# Patient Record
Sex: FEMALE | Race: WHITE | Employment: FULL TIME | URBAN - METROPOLITAN AREA
[De-identification: names, ages, dates, MRNs, and addresses within clinical notes are randomized per-mention and may not be internally consistent; named-entity substitution may affect disease eponyms.]

---

## 2017-04-24 RX ORDER — LOSARTAN POTASSIUM 100 MG/1
100 TABLET ORAL DAILY
Qty: 30 TABLET | Refills: 0 | Status: SHIPPED | OUTPATIENT
Start: 2017-04-24 | End: 2017-05-08

## 2017-04-24 RX ORDER — METOPROLOL TARTRATE 50 MG/1
50 TABLET, FILM COATED ORAL DAILY
Qty: 60 TABLET | Refills: 0 | Status: SHIPPED | OUTPATIENT
Start: 2017-04-24 | End: 2017-05-08 | Stop reason: SDUPTHER

## 2017-05-08 ENCOUNTER — OFFICE VISIT (OUTPATIENT)
Dept: FAMILY MEDICINE CLINIC | Age: 62
End: 2017-05-08

## 2017-05-08 VITALS
SYSTOLIC BLOOD PRESSURE: 128 MMHG | OXYGEN SATURATION: 98 % | HEART RATE: 66 BPM | WEIGHT: 206 LBS | DIASTOLIC BLOOD PRESSURE: 84 MMHG | BODY MASS INDEX: 32.26 KG/M2

## 2017-05-08 DIAGNOSIS — G89.29 CHRONIC BILATERAL LOW BACK PAIN WITHOUT SCIATICA: ICD-10-CM

## 2017-05-08 DIAGNOSIS — K21.9 GASTROESOPHAGEAL REFLUX DISEASE WITHOUT ESOPHAGITIS: ICD-10-CM

## 2017-05-08 DIAGNOSIS — E78.5 DYSLIPIDEMIA: ICD-10-CM

## 2017-05-08 DIAGNOSIS — M54.50 CHRONIC BILATERAL LOW BACK PAIN WITHOUT SCIATICA: ICD-10-CM

## 2017-05-08 DIAGNOSIS — I10 ESSENTIAL HYPERTENSION: Primary | ICD-10-CM

## 2017-05-08 DIAGNOSIS — I10 ESSENTIAL HYPERTENSION: ICD-10-CM

## 2017-05-08 LAB
ANION GAP SERPL CALCULATED.3IONS-SCNC: 16 MMOL/L (ref 3–16)
BUN BLDV-MCNC: 21 MG/DL (ref 7–20)
CALCIUM SERPL-MCNC: 9.5 MG/DL (ref 8.3–10.6)
CHLORIDE BLD-SCNC: 104 MMOL/L (ref 99–110)
CHOLESTEROL, TOTAL: 232 MG/DL (ref 0–199)
CO2: 23 MMOL/L (ref 21–32)
CREAT SERPL-MCNC: 0.9 MG/DL (ref 0.6–1.1)
GFR AFRICAN AMERICAN: >60
GFR NON-AFRICAN AMERICAN: >60
GLUCOSE BLD-MCNC: 92 MG/DL (ref 70–99)
HDLC SERPL-MCNC: 72 MG/DL (ref 40–60)
LDL CHOLESTEROL CALCULATED: 129 MG/DL
POTASSIUM SERPL-SCNC: 4.4 MMOL/L (ref 3.5–5.1)
SODIUM BLD-SCNC: 143 MMOL/L (ref 136–145)
TRIGL SERPL-MCNC: 153 MG/DL (ref 0–150)
VLDLC SERPL CALC-MCNC: 31 MG/DL

## 2017-05-08 PROCEDURE — 99214 OFFICE O/P EST MOD 30 MIN: CPT | Performed by: FAMILY MEDICINE

## 2017-05-08 RX ORDER — METOPROLOL TARTRATE 50 MG/1
50 TABLET, FILM COATED ORAL DAILY
Qty: 30 TABLET | Refills: 5 | Status: SHIPPED | OUTPATIENT
Start: 2017-05-08 | End: 2017-11-06 | Stop reason: SDUPTHER

## 2017-05-08 RX ORDER — LOSARTAN POTASSIUM 100 MG/1
100 TABLET ORAL DAILY
Qty: 30 TABLET | Refills: 5 | Status: SHIPPED | OUTPATIENT
Start: 2017-05-08 | End: 2017-05-10 | Stop reason: SDUPTHER

## 2017-05-10 DIAGNOSIS — I10 ESSENTIAL HYPERTENSION: ICD-10-CM

## 2017-05-11 RX ORDER — OMEPRAZOLE 20 MG/1
20 CAPSULE, DELAYED RELEASE ORAL 2 TIMES DAILY PRN
Qty: 60 CAPSULE | Refills: 5 | Status: SHIPPED | OUTPATIENT
Start: 2017-05-11 | End: 2017-11-06 | Stop reason: SDUPTHER

## 2017-05-11 RX ORDER — LOSARTAN POTASSIUM 100 MG/1
100 TABLET ORAL DAILY
Qty: 30 TABLET | Refills: 5 | Status: SHIPPED | OUTPATIENT
Start: 2017-05-11 | End: 2017-11-06 | Stop reason: SDUPTHER

## 2017-11-02 NOTE — PROGRESS NOTES
History and Physical      Chief Complaint:   Jannet Brdaley is a 63 y.o. female who presents for complete physical examination. HPI:       HTN:  Tolerating medications well and taking them as directed. Does not check BP at home. No symptoms concerning for end organ damage are present. Has not had BP meds yet today as fasting. Hiatal hernia/GERD: on omeprazole which helps with her symptoms- takes 1-2 times daily prn. If she doesn't take the omeprazole has a lot of heart burn. Hx of migraines: improved since the past.  Takes imitrex prn. Dyslipidemia:  Working on diet and weight loss. Taking omega 3. Fibromyalgia:  Diagnosed years ago. Doesn't remember what medications she tried but didn't like how she felt. Quit taking everything. Aledevendra was helping. Was told it was affecting her kidneys so stopped it. Chronic buttock pain:  Ortho- UC. Referred by ortho to pain management and given injection which didn't help at all. The biomed cream seems to be helping. Present 9+ months. Would like a refill. Insomnia:  Has had trouble sleeping for a long time. Worse recently. Takes advil prn. No improvement with melatonin in the past.     Hotflashes:  Gets hotflashes from time to time sometimes. Chronic since going of HRT 3-4 years ago. Vit d def: currently not on vit d. Hx knee infection:  8/2016 s/p infected L knee. S/p surgery to wash it out and PICC line. Resolved now. Has residual pain from time to time. 7/18/16  to patient Faizan Matute - Dr. Mervin Landry. Was not able to have kids. Joyce Eugene has 2 kids. HM:  S/p ALEXIS in 2002 for heavy periods and endometriosis. Off premarin 3-4 years. Per patient does not have a cervix.          The 10-year ASCVD risk score (Paulnia Soriano, et al., 2013) is: 4.9%    Values used to calculate the score:      Age: 63 years      Sex: Female      Is Non- : No      Diabetic: No      Tobacco smoker: No      Systolic Blood Pressure: 140 mmHg      Is BP treated: Yes      HDL Cholesterol: 72 mg/dL      Total Cholesterol: 232 mg/dL    Wt Readings from Last 3 Encounters:   11/06/17 210 lb 12.8 oz (95.6 kg)   05/08/17 206 lb (93.4 kg)   12/05/16 203 lb (92.1 kg)     BP Readings from Last 3 Encounters:   11/06/17 (!) 140/92   05/08/17 128/84   12/05/16 138/86       Patient Active Problem List   Diagnosis    Migraine    Insomnia    Fibromyalgia    HTN (hypertension)    GERD (gastroesophageal reflux disease)    Hiatal hernia    Healthcare maintenance    HLD (hyperlipidemia)       Preventive Care:  Health Maintenance   Topic Date Due    Cervical cancer screen  08/19/1978    Zostavax vaccine  08/19/2017    Flu vaccine (1) 09/01/2017    Colon Cancer Screen FIT/FOBT  12/21/2017    Breast cancer screen  02/23/2018    Lipid screen  05/08/2022    DTaP/Tdap/Td vaccine (2 - Td) 07/18/2026    Hepatitis C screen  Completed    HIV screen  Completed        Immunization History   Administered Date(s) Administered    Tdap (Boostrix, Adacel) 07/18/2016       Allergies   Allergen Reactions    Clarithromycin      Breathing and itching     Outpatient Prescriptions Marked as Taking for the 11/6/17 encounter (Office Visit) with Della Meeks MD   Medication Sig Dispense Refill    omeprazole (PRILOSEC) 20 MG delayed release capsule Take 1 capsule by mouth 2 times daily as needed (1 daily, 2 times per day as needed) 1 to 2 capsules by mouth every day.  60 capsule 5    losartan (COZAAR) 100 MG tablet Take 1 tablet by mouth daily 30 tablet 5    metoprolol tartrate (LOPRESSOR) 50 MG tablet Take 1 tablet by mouth daily 30 tablet 5    Multiple Vitamins-Minerals (HAIR/SKIN/NAILS PO) Take 3,000 mcg by mouth 3 times daily      Cholecalciferol (VITAMIN D3) 1000 UNITS TABS Take by mouth      Omega-3 Fatty Acids (OMEGA-3 FISH OIL) 1000 MG CAPS Take by mouth Daily with supper          Past Medical History:   Diagnosis Date    Fibromyalgia 1990's regular rhythm, normal heart sounds. Exam reveals no gallop and no friction rub. No murmur heard. Pulmonary/Chest: Effort normal and breath sounds normal. No respiratory distress. She has no wheezes, rhonchi or rales. Abdominal: Soft, non-tender. Bowel sounds are normal. She exhibits no palpable organomegaly or mass. Musculoskeletal: Normal range of motion. She exhibits no edema. Neurological: She is alert and oriented. No cranial nerve deficit. Coordination normal.   Skin: Skin is warm and dry. There is no rash or erythema. Psychiatric: She has a normal mood and affect. Her speech is normal and behavior is normal. Judgment, cognition and memory are normal.       Assessment/Plan     1. Healthcare maintenance  Annual physical exam done today. Counseled on preventative care and a healthy lifestlye. Discussed optimal calcium intake emphasizing the importance of getting as much of the daily calcium requirement as possible from dietary sources. Immunization history reviewed. FIT cards. Declined colonoscopy. - DEXA Bone Density Axial Skeleton; Future  - Varicella-zoster vaccine subcutaneous    2. Essential hypertension  Stable. Continue current regimen. Monitor at home. - losartan (COZAAR) 100 MG tablet; Take 1 tablet by mouth daily  Dispense: 30 tablet; Refill: 5  - metoprolol tartrate (LOPRESSOR) 50 MG tablet; Take 1 tablet by mouth daily  Dispense: 30 tablet; Refill: 5  - BASIC METABOLIC PANEL; Future    3. Gastroesophageal reflux disease without esophagitis  Stable. Continue current regimen. Risks of PPI's discussed. - omeprazole (PRILOSEC) 20 MG delayed release capsule; Take 1 capsule by mouth 2 times daily as needed (1 daily, 2 times per day as needed) 1 to 2 capsules by mouth every day. Dispense: 60 capsule; Refill: 5  - Vitamin B12; Future  - Methylmalonic Acid, Serum; Future    4.  Hyperlipidemia, unspecified hyperlipidemia type  Counseled on lifestyle modifications including diet and

## 2017-11-06 ENCOUNTER — OFFICE VISIT (OUTPATIENT)
Dept: FAMILY MEDICINE CLINIC | Age: 62
End: 2017-11-06

## 2017-11-06 VITALS
SYSTOLIC BLOOD PRESSURE: 140 MMHG | HEART RATE: 100 BPM | OXYGEN SATURATION: 96 % | DIASTOLIC BLOOD PRESSURE: 92 MMHG | BODY MASS INDEX: 33.02 KG/M2 | WEIGHT: 210.8 LBS

## 2017-11-06 DIAGNOSIS — I10 ESSENTIAL HYPERTENSION: ICD-10-CM

## 2017-11-06 DIAGNOSIS — M79.18 BUTTOCK PAIN: ICD-10-CM

## 2017-11-06 DIAGNOSIS — K21.9 GASTROESOPHAGEAL REFLUX DISEASE WITHOUT ESOPHAGITIS: ICD-10-CM

## 2017-11-06 DIAGNOSIS — G47.00 INSOMNIA, UNSPECIFIED TYPE: ICD-10-CM

## 2017-11-06 DIAGNOSIS — E55.9 VITAMIN D DEFICIENCY: ICD-10-CM

## 2017-11-06 DIAGNOSIS — R23.2 HOT FLASHES: ICD-10-CM

## 2017-11-06 DIAGNOSIS — Z00.00 HEALTHCARE MAINTENANCE: Primary | ICD-10-CM

## 2017-11-06 DIAGNOSIS — E78.5 HYPERLIPIDEMIA, UNSPECIFIED HYPERLIPIDEMIA TYPE: ICD-10-CM

## 2017-11-06 LAB
ANION GAP SERPL CALCULATED.3IONS-SCNC: 14 MMOL/L (ref 3–16)
BUN BLDV-MCNC: 21 MG/DL (ref 7–20)
CALCIUM SERPL-MCNC: 10 MG/DL (ref 8.3–10.6)
CHLORIDE BLD-SCNC: 100 MMOL/L (ref 99–110)
CHOLESTEROL, TOTAL: 258 MG/DL (ref 0–199)
CO2: 26 MMOL/L (ref 21–32)
CREAT SERPL-MCNC: 0.9 MG/DL (ref 0.6–1.2)
GFR AFRICAN AMERICAN: >60
GFR NON-AFRICAN AMERICAN: >60
GLUCOSE BLD-MCNC: 96 MG/DL (ref 70–99)
HDLC SERPL-MCNC: 76 MG/DL (ref 40–60)
LDL CHOLESTEROL CALCULATED: 141 MG/DL
POTASSIUM SERPL-SCNC: 4.5 MMOL/L (ref 3.5–5.1)
SODIUM BLD-SCNC: 140 MMOL/L (ref 136–145)
TRIGL SERPL-MCNC: 204 MG/DL (ref 0–150)
TSH SERPL DL<=0.05 MIU/L-ACNC: 1.69 UIU/ML (ref 0.27–4.2)
VITAMIN B-12: 404 PG/ML (ref 211–911)
VITAMIN D 25-HYDROXY: 25.6 NG/ML
VLDLC SERPL CALC-MCNC: 41 MG/DL

## 2017-11-06 PROCEDURE — 99396 PREV VISIT EST AGE 40-64: CPT | Performed by: FAMILY MEDICINE

## 2017-11-06 PROCEDURE — 90736 HZV VACCINE LIVE SUBQ: CPT | Performed by: FAMILY MEDICINE

## 2017-11-06 PROCEDURE — 90471 IMMUNIZATION ADMIN: CPT | Performed by: FAMILY MEDICINE

## 2017-11-06 RX ORDER — METOPROLOL TARTRATE 50 MG/1
50 TABLET, FILM COATED ORAL DAILY
Qty: 30 TABLET | Refills: 5 | Status: SHIPPED | OUTPATIENT
Start: 2017-11-06 | End: 2018-06-20 | Stop reason: SDUPTHER

## 2017-11-06 RX ORDER — OMEPRAZOLE 20 MG/1
20 CAPSULE, DELAYED RELEASE ORAL 2 TIMES DAILY PRN
Qty: 60 CAPSULE | Refills: 5 | Status: SHIPPED | OUTPATIENT
Start: 2017-11-06 | End: 2018-09-13 | Stop reason: SDUPTHER

## 2017-11-06 RX ORDER — LOSARTAN POTASSIUM 100 MG/1
100 TABLET ORAL DAILY
Qty: 30 TABLET | Refills: 5 | Status: SHIPPED | OUTPATIENT
Start: 2017-11-06 | End: 2018-06-20 | Stop reason: SDUPTHER

## 2017-11-06 NOTE — PATIENT INSTRUCTIONS
Ways to Prevent Osteoporosis     What Is Osteoporosis? Osteoporosis is a disease of the bones in which the bones become so weak that they break easily. Bones are made up of living tissue that is constantly being renewed. This process, called remodeling, consists of two stagesbone resorption and bone formation. During resorption, old bone is broken down and removed. During bone formation, new bone is built to replace the old. The remodeling process changes naturally throughout the life cycle. During childhood and early adulthood, new bone is formed faster than old bone is removed. Between ages 22 and 28, a peak bone mass (maximum density and strength) is reached. After this, bone loss starts to occur more than bone formation. In women, the rate of loss is greatest during the years after menopause . Osteoporosis occurs when there is an excessive amount of bone loss and/or insufficient bone formation. The bones become thin and weak, increasing the chance of fractures . Fractures are most common in the hip, spine, wrist, and ribs, but can occur in any bone. These fractures can result in trouble walking, severe pain, loss of height, spinal deformities, and decreased function. Because bone loss occurs without symptoms, people may not realize they have osteoporosis until a sudden bump or fall causes a fracture. Bone density tests may help predict who is at the greatest risk. Prevention at All Ages   Since this condition occurs mainly in older people, why should you be concerned about it during earlier years? Prevention of osteoporosis can begin in childhood, when bone mass is increasing. Diet, exercise, smoking , and use of alcohol and caffeine all affect bone formation throughout life. Preventive measures are also important when bone mass is decreasing, during midlife and just after menopause in women.    Calcium and Vitamin D   Good nutrition, especially an adequate supply of calcium , plays an important part in

## 2017-11-09 LAB — METHYLMALONIC ACID: 0.16 UMOL/L (ref 0–0.4)

## 2017-12-12 ENCOUNTER — OFFICE VISIT (OUTPATIENT)
Dept: FAMILY MEDICINE CLINIC | Age: 62
End: 2017-12-12

## 2017-12-12 VITALS
SYSTOLIC BLOOD PRESSURE: 118 MMHG | RESPIRATION RATE: 12 BRPM | HEIGHT: 67 IN | BODY MASS INDEX: 33.9 KG/M2 | DIASTOLIC BLOOD PRESSURE: 82 MMHG | TEMPERATURE: 97.8 F | WEIGHT: 216 LBS | OXYGEN SATURATION: 95 % | HEART RATE: 73 BPM

## 2017-12-12 DIAGNOSIS — R05.9 COUGH: ICD-10-CM

## 2017-12-12 DIAGNOSIS — J01.00 ACUTE NON-RECURRENT MAXILLARY SINUSITIS: Primary | ICD-10-CM

## 2017-12-12 PROCEDURE — 3017F COLORECTAL CA SCREEN DOC REV: CPT | Performed by: NURSE PRACTITIONER

## 2017-12-12 PROCEDURE — 3014F SCREEN MAMMO DOC REV: CPT | Performed by: NURSE PRACTITIONER

## 2017-12-12 PROCEDURE — G8427 DOCREV CUR MEDS BY ELIG CLIN: HCPCS | Performed by: NURSE PRACTITIONER

## 2017-12-12 PROCEDURE — G8417 CALC BMI ABV UP PARAM F/U: HCPCS | Performed by: NURSE PRACTITIONER

## 2017-12-12 PROCEDURE — 1036F TOBACCO NON-USER: CPT | Performed by: NURSE PRACTITIONER

## 2017-12-12 PROCEDURE — 99214 OFFICE O/P EST MOD 30 MIN: CPT | Performed by: NURSE PRACTITIONER

## 2017-12-12 PROCEDURE — G8484 FLU IMMUNIZE NO ADMIN: HCPCS | Performed by: NURSE PRACTITIONER

## 2017-12-12 RX ORDER — AMOXICILLIN AND CLAVULANATE POTASSIUM 875; 125 MG/1; MG/1
1 TABLET, FILM COATED ORAL 2 TIMES DAILY
Qty: 20 TABLET | Refills: 0 | Status: SHIPPED | OUTPATIENT
Start: 2017-12-12 | End: 2017-12-22

## 2017-12-12 RX ORDER — BENZONATATE 100 MG/1
100 CAPSULE ORAL 3 TIMES DAILY PRN
Qty: 21 CAPSULE | Refills: 0 | Status: SHIPPED | OUTPATIENT
Start: 2017-12-12 | End: 2017-12-19

## 2017-12-12 ASSESSMENT — ENCOUNTER SYMPTOMS
HEMOPTYSIS: 0
COUGH: 1
HEARTBURN: 0
RHINORRHEA: 1
SHORTNESS OF BREATH: 0
SORE THROAT: 1
WHEEZING: 0

## 2017-12-12 NOTE — PROGRESS NOTES
HPI:  12/12/2017    This is a 63 y.o. female   Chief Complaint   Patient presents with    Pharyngitis     x3 days, PND    Facial Pain    Cough     dry cough (\"coughing so much that had 4 bloody noses last night\"), congestion, some wheezing    Fatigue     Cough   This is a new problem. The current episode started in the past 7 days. The problem has been gradually worsening. The cough is non-productive. Associated symptoms include nasal congestion, postnasal drip, rhinorrhea and a sore throat (mild ). Pertinent negatives include no chest pain, chills, ear congestion, ear pain, fever, headaches, heartburn, hemoptysis, myalgias, rash, shortness of breath, sweats, weight loss or wheezing. Nothing aggravates the symptoms. She has tried OTC cough suppressant (zyrtec x1) for the symptoms. The treatment provided no relief. There is no history of asthma, bronchiectasis, bronchitis, COPD, emphysema, environmental allergies or pneumonia. /82 (Site: Left Arm, Position: Sitting, Cuff Size: Large Adult)   Pulse 73   Temp 97.8 °F (36.6 °C) (Oral)   Resp 12   Ht 5' 7\" (1.702 m)   Wt 216 lb (98 kg)   SpO2 95%   BMI 33.83 kg/m²     Allergies   Allergen Reactions    Clarithromycin      Breathing and itching     Current Outpatient Prescriptions   Medication Sig Dispense Refill    amoxicillin-clavulanate (AUGMENTIN) 875-125 MG per tablet Take 1 tablet by mouth 2 times daily for 10 days 20 tablet 0    benzonatate (TESSALON PERLES) 100 MG capsule Take 1 capsule by mouth 3 times daily as needed for Cough 21 capsule 0    omeprazole (PRILOSEC) 20 MG delayed release capsule Take 1 capsule by mouth 2 times daily as needed (1 daily, 2 times per day as needed) 1 to 2 capsules by mouth every day.  60 capsule 5    losartan (COZAAR) 100 MG tablet Take 1 tablet by mouth daily 30 tablet 5    metoprolol tartrate (LOPRESSOR) 50 MG tablet Take 1 tablet by mouth daily 30 tablet 5    Cholecalciferol (VITAMIN D3) 1000 UNITS TABS Take by mouth      Omega-3 Fatty Acids (OMEGA-3 FISH OIL) 1000 MG CAPS Take by mouth Daily with supper       Multiple Vitamins-Minerals (HAIR/SKIN/NAILS PO) Take 3,000 mcg by mouth 3 times daily       No current facility-administered medications for this visit. Review of Systems   Constitutional: Negative for chills, fever and weight loss. HENT: Positive for postnasal drip, rhinorrhea and sore throat (mild ). Negative for ear pain. Respiratory: Positive for cough. Negative for hemoptysis, shortness of breath and wheezing. Cardiovascular: Negative for chest pain. Gastrointestinal: Negative for heartburn. Musculoskeletal: Negative for myalgias. Skin: Negative for rash. Allergic/Immunologic: Negative for environmental allergies. Neurological: Negative for headaches. Physical Exam   Constitutional: She is oriented to person, place, and time. She appears well-developed and well-nourished. No distress. HENT:   Head: Normocephalic and atraumatic. Right Ear: Tympanic membrane, external ear and ear canal normal.   Left Ear: Tympanic membrane, external ear and ear canal normal.   Nose: Mucosal edema, rhinorrhea and sinus tenderness present. Right sinus exhibits maxillary sinus tenderness. Mouth/Throat: Uvula is midline and mucous membranes are normal. Posterior oropharyngeal erythema present. No oropharyngeal exudate, posterior oropharyngeal edema or tonsillar abscesses. Eyes: Conjunctivae are normal.   Neck: Normal range of motion. Neck supple. No tracheal deviation present. Cardiovascular: Normal rate, regular rhythm, normal heart sounds and intact distal pulses. Exam reveals no gallop and no friction rub. No murmur heard. Pulmonary/Chest: Effort normal and breath sounds normal. No respiratory distress. She has no wheezes. She has no rales. She exhibits no tenderness. Musculoskeletal: Normal range of motion. She exhibits no edema.    Lymphadenopathy:     She has no cervical

## 2018-02-09 ENCOUNTER — OFFICE VISIT (OUTPATIENT)
Dept: FAMILY MEDICINE CLINIC | Age: 63
End: 2018-02-09

## 2018-02-09 VITALS
BODY MASS INDEX: 33.62 KG/M2 | HEIGHT: 67 IN | HEART RATE: 72 BPM | DIASTOLIC BLOOD PRESSURE: 78 MMHG | SYSTOLIC BLOOD PRESSURE: 120 MMHG | WEIGHT: 214.2 LBS | OXYGEN SATURATION: 94 %

## 2018-02-09 DIAGNOSIS — E53.8 VITAMIN B12 DEFICIENCY: ICD-10-CM

## 2018-02-09 DIAGNOSIS — I10 ESSENTIAL HYPERTENSION: ICD-10-CM

## 2018-02-09 DIAGNOSIS — E78.5 HYPERLIPIDEMIA, UNSPECIFIED HYPERLIPIDEMIA TYPE: ICD-10-CM

## 2018-02-09 DIAGNOSIS — G43.109 MIGRAINE WITH AURA AND WITHOUT STATUS MIGRAINOSUS, NOT INTRACTABLE: ICD-10-CM

## 2018-02-09 DIAGNOSIS — M79.7 FIBROMYALGIA: ICD-10-CM

## 2018-02-09 DIAGNOSIS — K21.9 GASTROESOPHAGEAL REFLUX DISEASE WITHOUT ESOPHAGITIS: ICD-10-CM

## 2018-02-09 DIAGNOSIS — Z00.00 HEALTHCARE MAINTENANCE: Primary | ICD-10-CM

## 2018-02-09 DIAGNOSIS — K44.9 HIATAL HERNIA: ICD-10-CM

## 2018-02-09 DIAGNOSIS — E55.9 VITAMIN D DEFICIENCY: ICD-10-CM

## 2018-02-09 LAB
ANION GAP SERPL CALCULATED.3IONS-SCNC: 13 MMOL/L (ref 3–16)
BUN BLDV-MCNC: 12 MG/DL (ref 7–20)
CALCIUM SERPL-MCNC: 9.9 MG/DL (ref 8.3–10.6)
CHLORIDE BLD-SCNC: 104 MMOL/L (ref 99–110)
CHOLESTEROL, TOTAL: 223 MG/DL (ref 0–199)
CO2: 27 MMOL/L (ref 21–32)
CREAT SERPL-MCNC: 0.9 MG/DL (ref 0.6–1.2)
FOLATE: 6.26 NG/ML (ref 4.78–24.2)
GFR AFRICAN AMERICAN: >60
GFR NON-AFRICAN AMERICAN: >60
GLUCOSE BLD-MCNC: 100 MG/DL (ref 70–99)
HDLC SERPL-MCNC: 68 MG/DL (ref 40–60)
LDL CHOLESTEROL CALCULATED: 117 MG/DL
POTASSIUM SERPL-SCNC: 4.3 MMOL/L (ref 3.5–5.1)
SODIUM BLD-SCNC: 144 MMOL/L (ref 136–145)
TRIGL SERPL-MCNC: 190 MG/DL (ref 0–150)
VITAMIN B-12: 459 PG/ML (ref 211–911)
VITAMIN D 25-HYDROXY: 28.6 NG/ML
VLDLC SERPL CALC-MCNC: 38 MG/DL

## 2018-02-09 PROCEDURE — 99396 PREV VISIT EST AGE 40-64: CPT | Performed by: NURSE PRACTITIONER

## 2018-02-09 ASSESSMENT — PATIENT HEALTH QUESTIONNAIRE - PHQ9
SUM OF ALL RESPONSES TO PHQ QUESTIONS 1-9: 0
SUM OF ALL RESPONSES TO PHQ9 QUESTIONS 1 & 2: 0
1. LITTLE INTEREST OR PLEASURE IN DOING THINGS: 0
2. FEELING DOWN, DEPRESSED OR HOPELESS: 0

## 2018-02-09 ASSESSMENT — ENCOUNTER SYMPTOMS
WHEEZING: 0
BACK PAIN: 0
COUGH: 0
RHINORRHEA: 0
CONSTIPATION: 0
APNEA: 0
SINUS PRESSURE: 0
VOMITING: 0
EYE PAIN: 0
NAUSEA: 0
EYE REDNESS: 0
ABDOMINAL DISTENTION: 0
SHORTNESS OF BREATH: 0
DIARRHEA: 0
ABDOMINAL PAIN: 0
BLOOD IN STOOL: 0
CHEST TIGHTNESS: 0

## 2018-02-09 NOTE — PROGRESS NOTES
Currently     Other Topics Concern    Not on file     Social History Narrative    7/18/16     to patient Lanny Alvarez - Dr. Nikhil Manjarrez. Was not able to have kids. Jaret Landeros has 2 kids. Family History   Problem Relation Age of Onset    Heart Disease Mother     Cancer Maternal Cousin      breast      Current Outpatient Prescriptions   Medication Sig Dispense Refill    omeprazole (PRILOSEC) 20 MG delayed release capsule Take 1 capsule by mouth 2 times daily as needed (1 daily, 2 times per day as needed) 1 to 2 capsules by mouth every day. 60 capsule 5    losartan (COZAAR) 100 MG tablet Take 1 tablet by mouth daily 30 tablet 5    metoprolol tartrate (LOPRESSOR) 50 MG tablet Take 1 tablet by mouth daily 30 tablet 5    Cholecalciferol (VITAMIN D3) 1000 UNITS TABS Take by mouth      Omega-3 Fatty Acids (OMEGA-3 FISH OIL) 1000 MG CAPS Take by mouth Daily with supper        No current facility-administered medications for this visit.         Allergies   Allergen Reactions    Clarithromycin      Breathing and itching       Orders Only on 11/06/2017   Component Date Value Ref Range Status    Cholesterol, Total 11/06/2017 258* 0 - 199 mg/dL Final    Triglycerides 11/06/2017 204* 0 - 150 mg/dL Final    HDL 11/06/2017 76* 40 - 60 mg/dL Final    LDL Calculated 11/06/2017 141* <100 mg/dL Final    VLDL Cholesterol Calculated 11/06/2017 41  Not Established mg/dL Final    Sodium 11/06/2017 140  136 - 145 mmol/L Final    Potassium 11/06/2017 4.5  3.5 - 5.1 mmol/L Final    Chloride 11/06/2017 100  99 - 110 mmol/L Final    CO2 11/06/2017 26  21 - 32 mmol/L Final    Anion Gap 11/06/2017 14  3 - 16 Final    Glucose 11/06/2017 96  70 - 99 mg/dL Final    BUN 11/06/2017 21* 7 - 20 mg/dL Final    CREATININE 11/06/2017 0.9  0.6 - 1.2 mg/dL Final    GFR Non- 11/06/2017 >60  >60 Final    Comment: >60 mL/min/1.73m2 EGFR, calc. for ages 25 and older using the  MDRD formula (not corrected for weight), is valid

## 2018-02-14 LAB — METHYLMALONIC ACID: 0.17 UMOL/L (ref 0–0.4)

## 2018-03-28 DIAGNOSIS — K21.9 GASTROESOPHAGEAL REFLUX DISEASE WITHOUT ESOPHAGITIS: ICD-10-CM

## 2018-03-28 RX ORDER — OMEPRAZOLE 20 MG/1
20 CAPSULE, DELAYED RELEASE ORAL 2 TIMES DAILY PRN
Qty: 60 CAPSULE | Refills: 5 | Status: CANCELLED | OUTPATIENT
Start: 2018-03-28

## 2018-06-20 DIAGNOSIS — I10 ESSENTIAL HYPERTENSION: ICD-10-CM

## 2018-06-20 RX ORDER — METOPROLOL TARTRATE 50 MG/1
TABLET, FILM COATED ORAL
Qty: 30 TABLET | Refills: 4 | Status: SHIPPED | OUTPATIENT
Start: 2018-06-20 | End: 2018-11-14 | Stop reason: SDUPTHER

## 2018-06-20 RX ORDER — LOSARTAN POTASSIUM 100 MG/1
TABLET ORAL
Qty: 30 TABLET | Refills: 4 | Status: SHIPPED | OUTPATIENT
Start: 2018-06-20 | End: 2018-11-14 | Stop reason: SDUPTHER

## 2018-09-13 DIAGNOSIS — K21.9 GASTROESOPHAGEAL REFLUX DISEASE WITHOUT ESOPHAGITIS: ICD-10-CM

## 2018-09-13 RX ORDER — OMEPRAZOLE 20 MG/1
CAPSULE, DELAYED RELEASE ORAL
Qty: 60 CAPSULE | Refills: 4 | Status: SHIPPED | OUTPATIENT
Start: 2018-09-13 | End: 2019-02-11 | Stop reason: SDUPTHER

## 2018-09-13 NOTE — TELEPHONE ENCOUNTER
Medication:   Requested Prescriptions     Pending Prescriptions Disp Refills    omeprazole (PRILOSEC) 20 MG delayed release capsule [Pharmacy Med Name: OMEPRAZOLE DR 20 MG CAPSULE] 60 capsule 4     Sig: TAKE ONE TO TWO CAPSULES BY MOUTH DAILY AS NEEDED        Last Filled:  11.6.2017 #60 w/ 5 refills     Patient Phone Number: [phone] (home)      Last appt: 02.09.2018  Return in about 1 year (around 2/9/2019), or if symptoms worsen or fail to improve. Next appt: Visit date not found    Last OARRS: No flowsheet data found.     Preferred Pharmacy:   [address] [place] [address] [phone]  [address]  Phone: [phone] Fax: [phone]

## 2018-11-14 DIAGNOSIS — I10 ESSENTIAL HYPERTENSION: ICD-10-CM

## 2018-11-15 RX ORDER — LOSARTAN POTASSIUM 100 MG/1
TABLET ORAL
Qty: 30 TABLET | Refills: 3 | Status: SHIPPED | OUTPATIENT
Start: 2018-11-15 | End: 2019-03-14 | Stop reason: SDUPTHER

## 2018-11-15 RX ORDER — METOPROLOL TARTRATE 50 MG/1
TABLET, FILM COATED ORAL
Qty: 30 TABLET | Refills: 3 | Status: SHIPPED | OUTPATIENT
Start: 2018-11-15 | End: 2019-07-30 | Stop reason: SDUPTHER

## 2019-02-11 ENCOUNTER — OFFICE VISIT (OUTPATIENT)
Dept: FAMILY MEDICINE CLINIC | Age: 64
End: 2019-02-11
Payer: COMMERCIAL

## 2019-02-11 VITALS
DIASTOLIC BLOOD PRESSURE: 78 MMHG | WEIGHT: 215.6 LBS | BODY MASS INDEX: 33.84 KG/M2 | OXYGEN SATURATION: 96 % | SYSTOLIC BLOOD PRESSURE: 128 MMHG | HEIGHT: 67 IN | HEART RATE: 78 BPM

## 2019-02-11 DIAGNOSIS — E55.9 VITAMIN D DEFICIENCY: ICD-10-CM

## 2019-02-11 DIAGNOSIS — K21.9 GASTROESOPHAGEAL REFLUX DISEASE WITHOUT ESOPHAGITIS: ICD-10-CM

## 2019-02-11 DIAGNOSIS — M79.7 FIBROMYALGIA: ICD-10-CM

## 2019-02-11 DIAGNOSIS — E78.5 HYPERLIPIDEMIA, UNSPECIFIED HYPERLIPIDEMIA TYPE: ICD-10-CM

## 2019-02-11 DIAGNOSIS — I10 ESSENTIAL HYPERTENSION: ICD-10-CM

## 2019-02-11 DIAGNOSIS — Z00.00 HEALTHCARE MAINTENANCE: Primary | ICD-10-CM

## 2019-02-11 DIAGNOSIS — Z00.00 HEALTHCARE MAINTENANCE: ICD-10-CM

## 2019-02-11 LAB
A/G RATIO: 1.7 (ref 1.1–2.2)
ALBUMIN SERPL-MCNC: 4.5 G/DL (ref 3.4–5)
ALP BLD-CCNC: 75 U/L (ref 40–129)
ALT SERPL-CCNC: 20 U/L (ref 10–40)
ANION GAP SERPL CALCULATED.3IONS-SCNC: 13 MMOL/L (ref 3–16)
AST SERPL-CCNC: 21 U/L (ref 15–37)
BILIRUB SERPL-MCNC: 1.1 MG/DL (ref 0–1)
BUN BLDV-MCNC: 15 MG/DL (ref 7–20)
CALCIUM SERPL-MCNC: 10.5 MG/DL (ref 8.3–10.6)
CHLORIDE BLD-SCNC: 104 MMOL/L (ref 99–110)
CHOLESTEROL, TOTAL: 234 MG/DL (ref 0–199)
CO2: 26 MMOL/L (ref 21–32)
CREAT SERPL-MCNC: 1 MG/DL (ref 0.6–1.2)
GFR AFRICAN AMERICAN: >60
GFR NON-AFRICAN AMERICAN: 56
GLOBULIN: 2.6 G/DL
GLUCOSE BLD-MCNC: 106 MG/DL (ref 70–99)
HDLC SERPL-MCNC: 68 MG/DL (ref 40–60)
LDL CHOLESTEROL CALCULATED: 133 MG/DL
POTASSIUM SERPL-SCNC: 4.1 MMOL/L (ref 3.5–5.1)
SODIUM BLD-SCNC: 143 MMOL/L (ref 136–145)
TOTAL PROTEIN: 7.1 G/DL (ref 6.4–8.2)
TRIGL SERPL-MCNC: 165 MG/DL (ref 0–150)
VITAMIN D 25-HYDROXY: 30.5 NG/ML
VLDLC SERPL CALC-MCNC: 33 MG/DL

## 2019-02-11 PROCEDURE — 99396 PREV VISIT EST AGE 40-64: CPT | Performed by: NURSE PRACTITIONER

## 2019-02-11 RX ORDER — OMEPRAZOLE 20 MG/1
CAPSULE, DELAYED RELEASE ORAL
Qty: 60 CAPSULE | Refills: 5 | Status: SHIPPED | OUTPATIENT
Start: 2019-02-11 | End: 2019-07-30 | Stop reason: SDUPTHER

## 2019-02-11 ASSESSMENT — PATIENT HEALTH QUESTIONNAIRE - PHQ9
SUM OF ALL RESPONSES TO PHQ QUESTIONS 1-9: 0
1. LITTLE INTEREST OR PLEASURE IN DOING THINGS: 0
2. FEELING DOWN, DEPRESSED OR HOPELESS: 0
SUM OF ALL RESPONSES TO PHQ QUESTIONS 1-9: 0
SUM OF ALL RESPONSES TO PHQ9 QUESTIONS 1 & 2: 0

## 2019-02-11 ASSESSMENT — ENCOUNTER SYMPTOMS
EYE PAIN: 0
COUGH: 0
NAUSEA: 0
APNEA: 0
VOMITING: 0
EYE REDNESS: 0
CHEST TIGHTNESS: 0
WHEEZING: 0
CONSTIPATION: 0
SHORTNESS OF BREATH: 0
RHINORRHEA: 0
BLOOD IN STOOL: 0
SINUS PRESSURE: 0
ABDOMINAL PAIN: 0
ABDOMINAL DISTENTION: 0
BACK PAIN: 0
DIARRHEA: 0

## 2019-03-01 DIAGNOSIS — Z12.11 COLON CANCER SCREENING: Primary | ICD-10-CM

## 2019-03-01 LAB
CONTROL: POSITIVE
HEMOCCULT STL QL: NEGATIVE

## 2019-03-01 PROCEDURE — 82274 ASSAY TEST FOR BLOOD FECAL: CPT | Performed by: FAMILY MEDICINE

## 2019-03-14 DIAGNOSIS — I10 ESSENTIAL HYPERTENSION: ICD-10-CM

## 2019-03-14 RX ORDER — LOSARTAN POTASSIUM 100 MG/1
TABLET ORAL
Qty: 30 TABLET | Refills: 2 | Status: SHIPPED | OUTPATIENT
Start: 2019-03-14 | End: 2019-11-03 | Stop reason: SDUPTHER

## 2019-07-30 ENCOUNTER — OFFICE VISIT (OUTPATIENT)
Dept: FAMILY MEDICINE CLINIC | Age: 64
End: 2019-07-30
Payer: COMMERCIAL

## 2019-07-30 VITALS
HEART RATE: 77 BPM | OXYGEN SATURATION: 98 % | SYSTOLIC BLOOD PRESSURE: 118 MMHG | HEIGHT: 67 IN | BODY MASS INDEX: 33.77 KG/M2 | DIASTOLIC BLOOD PRESSURE: 88 MMHG

## 2019-07-30 DIAGNOSIS — I10 ESSENTIAL HYPERTENSION: Primary | ICD-10-CM

## 2019-07-30 DIAGNOSIS — E55.9 VITAMIN D DEFICIENCY: ICD-10-CM

## 2019-07-30 DIAGNOSIS — Z87.898 HISTORY OF BREAST LUMP: ICD-10-CM

## 2019-07-30 DIAGNOSIS — E78.5 HYPERLIPIDEMIA, UNSPECIFIED HYPERLIPIDEMIA TYPE: ICD-10-CM

## 2019-07-30 DIAGNOSIS — K21.9 GASTROESOPHAGEAL REFLUX DISEASE WITHOUT ESOPHAGITIS: ICD-10-CM

## 2019-07-30 DIAGNOSIS — J20.9 ACUTE BRONCHITIS, UNSPECIFIED ORGANISM: ICD-10-CM

## 2019-07-30 PROCEDURE — 99214 OFFICE O/P EST MOD 30 MIN: CPT | Performed by: NURSE PRACTITIONER

## 2019-07-30 RX ORDER — PREDNISONE 20 MG/1
TABLET ORAL
COMMUNITY
Start: 2019-07-06 | End: 2019-07-30 | Stop reason: ALTCHOICE

## 2019-07-30 RX ORDER — BENZONATATE 100 MG/1
CAPSULE ORAL
COMMUNITY
Start: 2019-06-23 | End: 2019-07-30 | Stop reason: SDUPTHER

## 2019-07-30 RX ORDER — METOPROLOL TARTRATE 50 MG/1
TABLET, FILM COATED ORAL
Qty: 30 TABLET | Refills: 5 | Status: SHIPPED | OUTPATIENT
Start: 2019-07-30 | End: 2020-01-07 | Stop reason: SDUPTHER

## 2019-07-30 RX ORDER — ALBUTEROL SULFATE 90 UG/1
2 AEROSOL, METERED RESPIRATORY (INHALATION) 4 TIMES DAILY PRN
Qty: 1 INHALER | Refills: 0 | Status: SHIPPED | OUTPATIENT
Start: 2019-07-30 | End: 2021-02-01 | Stop reason: ALTCHOICE

## 2019-07-30 RX ORDER — BENZONATATE 100 MG/1
100 CAPSULE ORAL 3 TIMES DAILY PRN
Qty: 30 CAPSULE | Refills: 0 | Status: SHIPPED | OUTPATIENT
Start: 2019-07-30 | End: 2020-01-27 | Stop reason: ALTCHOICE

## 2019-07-30 RX ORDER — BROMPHENIRAMINE MALEATE, PSEUDOEPHEDRINE HYDROCHLORIDE, AND DEXTROMETHORPHAN HYDROBROMIDE 2; 30; 10 MG/5ML; MG/5ML; MG/5ML
SYRUP ORAL
COMMUNITY
Start: 2019-07-06 | End: 2019-07-30 | Stop reason: ALTCHOICE

## 2019-07-30 RX ORDER — AZITHROMYCIN 250 MG/1
TABLET, FILM COATED ORAL
COMMUNITY
Start: 2019-07-06 | End: 2019-07-30 | Stop reason: ALTCHOICE

## 2019-07-30 RX ORDER — LOSARTAN POTASSIUM 100 MG/1
TABLET ORAL
Qty: 30 TABLET | Refills: 2 | Status: CANCELLED | OUTPATIENT
Start: 2019-07-30

## 2019-07-30 RX ORDER — CETIRIZINE HYDROCHLORIDE 10 MG/1
10 TABLET ORAL DAILY
Qty: 30 TABLET | Refills: 0 | Status: SHIPPED | OUTPATIENT
Start: 2019-07-30 | End: 2019-08-29

## 2019-07-30 RX ORDER — OMEPRAZOLE 20 MG/1
CAPSULE, DELAYED RELEASE ORAL
Qty: 60 CAPSULE | Refills: 5 | Status: SHIPPED | OUTPATIENT
Start: 2019-07-30 | End: 2021-02-01 | Stop reason: SDUPTHER

## 2019-07-30 ASSESSMENT — ENCOUNTER SYMPTOMS
CHEST TIGHTNESS: 0
SORE THROAT: 0
COUGH: 1
ABDOMINAL PAIN: 0
SHORTNESS OF BREATH: 1
RHINORRHEA: 0

## 2019-07-30 NOTE — PROGRESS NOTES
delayed release capsule TAKE ONE TO TWO CAPSULES BY MOUTH DAILY AS NEEDED Yes Jass Hamper, APRN - CNP   albuterol sulfate  (90 Base) MCG/ACT inhaler Inhale 2 puffs into the lungs 4 times daily as needed for Wheezing Yes Jass Hamper, APRN - CNP   cetirizine (ZYRTEC) 10 MG tablet Take 1 tablet by mouth daily Yes Jass Hamper, APRN - CNP   benzonatate (TESSALON) 100 MG capsule Take 1 capsule by mouth 3 times daily as needed for Cough Yes Jass Hamper, APRN - CNP   losartan (COZAAR) 100 MG tablet TAKE ONE TABLET BY MOUTH DAILY Yes Jass Hamper, APRN - CNP   Cholecalciferol (VITAMIN D3) 1000 UNITS TABS Take by mouth Yes Historical Provider, MD   Omega-3 Fatty Acids (OMEGA-3 FISH OIL) 1000 MG CAPS Take by mouth Daily with supper  Yes Historical Provider, MD        Social History     Tobacco Use    Smoking status: Never Smoker    Smokeless tobacco: Never Used   Substance Use Topics    Alcohol use: Yes     Comment: socially; 1-2 times yearly        Vitals:    07/30/19 1504   BP: 118/88   Site: Right Upper Arm   Position: Sitting   Cuff Size: Large Adult   Pulse: 77   SpO2: 98%   Height: 5' 7\" (1.702 m)     Estimated body mass index is 33.77 kg/m² as calculated from the following:    Height as of this encounter: 5' 7\" (1.702 m). Weight as of 2/11/19: 215 lb 9.6 oz (97.8 kg). Physical Exam   Constitutional: She is oriented to person, place, and time. She appears well-developed and well-nourished. HENT:   Head: Normocephalic. Right Ear: Tympanic membrane, external ear and ear canal normal.   Left Ear: Tympanic membrane, external ear and ear canal normal.   Nose: Nose normal. Right sinus exhibits no maxillary sinus tenderness and no frontal sinus tenderness. Left sinus exhibits no maxillary sinus tenderness and no frontal sinus tenderness.    Mouth/Throat: Uvula is midline and oropharynx is clear and moist.   Cardiovascular: Normal rate, regular rhythm, S1 normal, S2 normal, normal heart sounds and normal pulses. Pulmonary/Chest: Effort normal and breath sounds normal.   Musculoskeletal: She exhibits no edema. Neurological: She is alert and oriented to person, place, and time. Psychiatric: She has a normal mood and affect. ASSESSMENT/PLAN:  1. Essential hypertension  Stable;  Continue current regimen. Discussed risks and benefits of medication.  - metoprolol tartrate (LOPRESSOR) 50 MG tablet; TAKE ONE TABLET BY MOUTH DAILY  Dispense: 30 tablet; Refill: 5    2. Gastroesophageal reflux disease without esophagitis  Stable;  Continue current regimen. - omeprazole (PRILOSEC) 20 MG delayed release capsule; TAKE ONE TO TWO CAPSULES BY MOUTH DAILY AS NEEDED  Dispense: 60 capsule; Refill: 5    3. Vitamin D deficiency  Stable;  Continue current regimen. 4. Hyperlipidemia, unspecified hyperlipidemia type  Stable;  Continue current regimen. 5. Acute bronchitis  Stable;  Begin albuterol and tessalon perles. 6. History of breast lump  Stable;  Continue yearly mammograms. Follow Up:     Return in about 6 months (around 1/30/2020), or if symptoms worsen or fail to improve.

## 2019-11-03 DIAGNOSIS — I10 ESSENTIAL HYPERTENSION: ICD-10-CM

## 2019-11-04 RX ORDER — LOSARTAN POTASSIUM 100 MG/1
TABLET ORAL
Qty: 30 TABLET | Refills: 1 | Status: SHIPPED | OUTPATIENT
Start: 2019-11-04 | End: 2020-01-02

## 2020-01-02 RX ORDER — LOSARTAN POTASSIUM 100 MG/1
TABLET ORAL
Qty: 90 TABLET | Refills: 0 | Status: SHIPPED | OUTPATIENT
Start: 2020-01-02 | End: 2020-04-01

## 2020-01-02 NOTE — TELEPHONE ENCOUNTER
Last seen 7/30  There is a  Note on  Medication request for an alternative from insurance company  Medication pended

## 2020-01-09 RX ORDER — METOPROLOL TARTRATE 50 MG/1
TABLET, FILM COATED ORAL
Qty: 30 TABLET | Refills: 5 | Status: SHIPPED | OUTPATIENT
Start: 2020-01-09 | End: 2020-08-11 | Stop reason: SDUPTHER

## 2020-01-27 ENCOUNTER — OFFICE VISIT (OUTPATIENT)
Dept: FAMILY MEDICINE CLINIC | Age: 65
End: 2020-01-27
Payer: COMMERCIAL

## 2020-01-27 VITALS
SYSTOLIC BLOOD PRESSURE: 122 MMHG | HEIGHT: 67 IN | WEIGHT: 215 LBS | DIASTOLIC BLOOD PRESSURE: 84 MMHG | BODY MASS INDEX: 33.74 KG/M2 | HEART RATE: 74 BPM | OXYGEN SATURATION: 97 %

## 2020-01-27 DIAGNOSIS — Z13.1 SCREENING FOR DIABETES MELLITUS: ICD-10-CM

## 2020-01-27 DIAGNOSIS — E78.5 HYPERLIPIDEMIA, UNSPECIFIED HYPERLIPIDEMIA TYPE: ICD-10-CM

## 2020-01-27 DIAGNOSIS — E55.9 VITAMIN D DEFICIENCY: ICD-10-CM

## 2020-01-27 DIAGNOSIS — Z13.29 SCREENING FOR THYROID DISORDER: ICD-10-CM

## 2020-01-27 DIAGNOSIS — I10 ESSENTIAL HYPERTENSION: ICD-10-CM

## 2020-01-27 DIAGNOSIS — M79.10 MYALGIA: ICD-10-CM

## 2020-01-27 LAB
A/G RATIO: 1.7 (ref 1.1–2.2)
ALBUMIN SERPL-MCNC: 4.5 G/DL (ref 3.4–5)
ALP BLD-CCNC: 79 U/L (ref 40–129)
ALT SERPL-CCNC: 20 U/L (ref 10–40)
ANION GAP SERPL CALCULATED.3IONS-SCNC: 15 MMOL/L (ref 3–16)
AST SERPL-CCNC: 18 U/L (ref 15–37)
BILIRUB SERPL-MCNC: 0.8 MG/DL (ref 0–1)
BUN BLDV-MCNC: 14 MG/DL (ref 7–20)
CALCIUM SERPL-MCNC: 10 MG/DL (ref 8.3–10.6)
CHLORIDE BLD-SCNC: 106 MMOL/L (ref 99–110)
CHOLESTEROL, TOTAL: 229 MG/DL (ref 0–199)
CO2: 24 MMOL/L (ref 21–32)
CREAT SERPL-MCNC: 1 MG/DL (ref 0.6–1.2)
GFR AFRICAN AMERICAN: >60
GFR NON-AFRICAN AMERICAN: 56
GLOBULIN: 2.6 G/DL
GLUCOSE BLD-MCNC: 103 MG/DL (ref 70–99)
HDLC SERPL-MCNC: 70 MG/DL (ref 40–60)
LDL CHOLESTEROL CALCULATED: 132 MG/DL
MAGNESIUM: 2.1 MG/DL (ref 1.8–2.4)
POTASSIUM SERPL-SCNC: 4.5 MMOL/L (ref 3.5–5.1)
SODIUM BLD-SCNC: 145 MMOL/L (ref 136–145)
TOTAL PROTEIN: 7.1 G/DL (ref 6.4–8.2)
TRIGL SERPL-MCNC: 134 MG/DL (ref 0–150)
TSH SERPL DL<=0.05 MIU/L-ACNC: 2.34 UIU/ML (ref 0.27–4.2)
VITAMIN D 25-HYDROXY: 32.8 NG/ML
VLDLC SERPL CALC-MCNC: 27 MG/DL

## 2020-01-27 PROCEDURE — 90471 IMMUNIZATION ADMIN: CPT | Performed by: NURSE PRACTITIONER

## 2020-01-27 PROCEDURE — 90750 HZV VACC RECOMBINANT IM: CPT | Performed by: NURSE PRACTITIONER

## 2020-01-27 PROCEDURE — 99396 PREV VISIT EST AGE 40-64: CPT | Performed by: NURSE PRACTITIONER

## 2020-01-27 RX ORDER — BIOTIN 2500 MCG
CAPSULE ORAL
COMMUNITY
End: 2021-02-01

## 2020-01-27 ASSESSMENT — PATIENT HEALTH QUESTIONNAIRE - PHQ9
SUM OF ALL RESPONSES TO PHQ QUESTIONS 1-9: 0
2. FEELING DOWN, DEPRESSED OR HOPELESS: 0
1. LITTLE INTEREST OR PLEASURE IN DOING THINGS: 0
SUM OF ALL RESPONSES TO PHQ QUESTIONS 1-9: 0
SUM OF ALL RESPONSES TO PHQ9 QUESTIONS 1 & 2: 0

## 2020-01-27 ASSESSMENT — ENCOUNTER SYMPTOMS
DIARRHEA: 0
VOMITING: 0
SHORTNESS OF BREATH: 0
NAUSEA: 0
ABDOMINAL PAIN: 0
RHINORRHEA: 0

## 2020-01-27 NOTE — PROGRESS NOTES
[facility] PRIMARY [facility]  [facility] [id number]  Phone: [phone]    2020    Alfredo Diaz (:  1955) is a 60 y.o. female, here for a preventive medicine evaluation. Patient Active Problem List   Diagnosis    Migraine    Insomnia    Fibromyalgia    HTN (hypertension)    GERD (gastroesophageal reflux disease)    Hiatal hernia    HLD (hyperlipidemia)     Diet: has been stress eating, likes to snack, eats 1-2 meals/day, eats out 2x/week    Exercise: Does not do- constantly walking at work    Fibromyalgia  Dx by old PCP  Does not take any medication- has been trying to avoid it    Memory changes  Since L knee replacement in 2016- vancomycin BID x 5 weeks  Does jackpot party, linette, words with friends- on game show [employer]  Is not sleeping well- has taken melatonin previously  + stress    Right shoulder rash  Itchy  No rash present  Alcohol, clinique cleanse    HTN  On Losartan, metoprolol    GERD  On omeprazole  Knows her triggers- [place], [place]    Got flu vaccine at work  Full hysterectomy in      is Janki Negron; [place] and [address], [address]    Review of Systems   Constitutional: Negative for activity change, appetite change, fatigue and fever. HENT: Negative for congestion and rhinorrhea. Respiratory: Negative for shortness of breath. Cardiovascular: Negative for chest pain. Gastrointestinal: Negative for abdominal pain, diarrhea, nausea and vomiting. GERD   Musculoskeletal: Positive for myalgias. Skin: Positive for rash. Neurological: Positive for headaches. Negative for dizziness. Psychiatric/Behavioral: Positive for sleep disturbance. Prior to Visit Medications    Medication Sig Taking?  Authorizing Provider   Biotin 2500 MCG CAPS Take by mouth Yes Historical Provider, MD   triamcinolone (KENALOG) 0.1 % ointment Apply topically 2 times daily Yes Eloise Gary, APRN - CNP   metoprolol tartrate session: Not on file    Stress: Not on file   Relationships    Social connections:     Talks on phone: Not on file     Gets together: Not on file     Attends Faith service: Not on file     Active member of club or organization: Not on file     Attends meetings of clubs or organizations: Not on file     Relationship status: Not on file    Intimate partner violence:     Fear of current or ex partner: Not on file     Emotionally abused: Not on file     Physically abused: Not on file     Forced sexual activity: Not on file   Other Topics Concern    Not on file   Social History Narrative    7/18/16     to patient Heidi Romelia - Dr. Rosie Joy. Was not able to have kids. Katelynn Durbin has 2 kids. Family History   Problem Relation Age of Onset    Heart Disease Mother     Cancer Maternal Cousin         breast        ADVANCE DIRECTIVE: N, Not Received    Vitals:    01/27/20 0744   BP: 122/84   Site: Left Upper Arm   Position: Sitting   Cuff Size: Large Adult   Pulse: 74   SpO2: 97%   Weight: 215 lb (97.5 kg)   Height: 5' 7\" (1.702 m)     Estimated body mass index is 33.67 kg/m² as calculated from the following:    Height as of this encounter: 5' 7\" (1.702 m). Weight as of this encounter: 215 lb (97.5 kg). Physical Exam  Vitals signs reviewed. Constitutional:       Appearance: Normal appearance. HENT:      Head: Normocephalic. Right Ear: Tympanic membrane, ear canal and external ear normal.      Left Ear: Tympanic membrane, ear canal and external ear normal.      Nose: Nose normal.      Mouth/Throat:      Lips: Pink. Mouth: Mucous membranes are moist.      Pharynx: Oropharynx is clear. Cardiovascular:      Rate and Rhythm: Normal rate and regular rhythm. Pulses: Normal pulses. Heart sounds: Normal heart sounds, S1 normal and S2 normal.   Pulmonary:      Effort: Pulmonary effort is normal.      Breath sounds: Normal breath sounds and air entry.    Abdominal:      Palpations: Abdomen is

## 2020-01-28 LAB
ESTIMATED AVERAGE GLUCOSE: 116.9 MG/DL
HBA1C MFR BLD: 5.7 %

## 2020-04-01 RX ORDER — LOSARTAN POTASSIUM 100 MG/1
TABLET ORAL
Qty: 90 TABLET | Refills: 2 | Status: SHIPPED | OUTPATIENT
Start: 2020-04-01 | End: 2020-12-28

## 2020-08-11 RX ORDER — METOPROLOL TARTRATE 50 MG/1
TABLET, FILM COATED ORAL
Qty: 90 TABLET | Refills: 1 | Status: SHIPPED | OUTPATIENT
Start: 2020-08-11 | End: 2021-02-01 | Stop reason: SDUPTHER

## 2020-12-28 RX ORDER — LOSARTAN POTASSIUM 100 MG/1
TABLET ORAL
Qty: 90 TABLET | Refills: 1 | Status: SHIPPED | OUTPATIENT
Start: 2020-12-28 | End: 2021-09-03

## 2021-02-01 ENCOUNTER — OFFICE VISIT (OUTPATIENT)
Dept: FAMILY MEDICINE CLINIC | Age: 66
End: 2021-02-01
Payer: COMMERCIAL

## 2021-02-01 VITALS
HEIGHT: 67 IN | DIASTOLIC BLOOD PRESSURE: 68 MMHG | SYSTOLIC BLOOD PRESSURE: 118 MMHG | OXYGEN SATURATION: 97 % | TEMPERATURE: 97.3 F | BODY MASS INDEX: 33.27 KG/M2 | WEIGHT: 212 LBS | HEART RATE: 67 BPM

## 2021-02-01 DIAGNOSIS — E78.5 HYPERLIPIDEMIA, UNSPECIFIED HYPERLIPIDEMIA TYPE: ICD-10-CM

## 2021-02-01 DIAGNOSIS — Z13.1 SCREENING FOR DIABETES MELLITUS: ICD-10-CM

## 2021-02-01 DIAGNOSIS — Z00.00 WELL ADULT EXAM: Primary | ICD-10-CM

## 2021-02-01 DIAGNOSIS — I10 ESSENTIAL HYPERTENSION: ICD-10-CM

## 2021-02-01 DIAGNOSIS — Z12.11 SCREENING FOR COLON CANCER: ICD-10-CM

## 2021-02-01 DIAGNOSIS — K21.9 GASTROESOPHAGEAL REFLUX DISEASE WITHOUT ESOPHAGITIS: ICD-10-CM

## 2021-02-01 DIAGNOSIS — M79.7 FIBROMYALGIA: ICD-10-CM

## 2021-02-01 DIAGNOSIS — E55.9 VITAMIN D DEFICIENCY: ICD-10-CM

## 2021-02-01 LAB
A/G RATIO: 1.7 (ref 1.1–2.2)
ALBUMIN SERPL-MCNC: 4.7 G/DL (ref 3.4–5)
ALP BLD-CCNC: 95 U/L (ref 40–129)
ALT SERPL-CCNC: 17 U/L (ref 10–40)
ANION GAP SERPL CALCULATED.3IONS-SCNC: 14 MMOL/L (ref 3–16)
AST SERPL-CCNC: 18 U/L (ref 15–37)
BILIRUB SERPL-MCNC: 0.6 MG/DL (ref 0–1)
BUN BLDV-MCNC: 15 MG/DL (ref 7–20)
CALCIUM SERPL-MCNC: 10 MG/DL (ref 8.3–10.6)
CHLORIDE BLD-SCNC: 101 MMOL/L (ref 99–110)
CHOLESTEROL, TOTAL: 252 MG/DL (ref 0–199)
CO2: 26 MMOL/L (ref 21–32)
CREAT SERPL-MCNC: 1 MG/DL (ref 0.6–1.2)
GFR AFRICAN AMERICAN: >60
GFR NON-AFRICAN AMERICAN: 56
GLOBULIN: 2.8 G/DL
GLUCOSE BLD-MCNC: 97 MG/DL (ref 70–99)
HDLC SERPL-MCNC: 63 MG/DL (ref 40–60)
LDL CHOLESTEROL CALCULATED: 139 MG/DL
POTASSIUM SERPL-SCNC: 4.1 MMOL/L (ref 3.5–5.1)
SODIUM BLD-SCNC: 141 MMOL/L (ref 136–145)
TOTAL PROTEIN: 7.5 G/DL (ref 6.4–8.2)
TRIGL SERPL-MCNC: 249 MG/DL (ref 0–150)
VLDLC SERPL CALC-MCNC: 50 MG/DL

## 2021-02-01 PROCEDURE — 99396 PREV VISIT EST AGE 40-64: CPT | Performed by: NURSE PRACTITIONER

## 2021-02-01 PROCEDURE — 90471 IMMUNIZATION ADMIN: CPT | Performed by: NURSE PRACTITIONER

## 2021-02-01 PROCEDURE — 90750 HZV VACC RECOMBINANT IM: CPT | Performed by: NURSE PRACTITIONER

## 2021-02-01 RX ORDER — METOPROLOL TARTRATE 50 MG/1
TABLET, FILM COATED ORAL
Qty: 90 TABLET | Refills: 1 | Status: SHIPPED | OUTPATIENT
Start: 2021-02-01 | End: 2021-09-20

## 2021-02-01 RX ORDER — MULTIVITAMIN WITH IRON
250 TABLET ORAL DAILY
COMMUNITY
End: 2022-02-01

## 2021-02-01 RX ORDER — OMEPRAZOLE 20 MG/1
CAPSULE, DELAYED RELEASE ORAL
Qty: 60 CAPSULE | Refills: 5 | Status: SHIPPED | OUTPATIENT
Start: 2021-02-01 | End: 2022-03-14

## 2021-02-01 RX ORDER — ACETAMINOPHEN 160 MG
TABLET,DISINTEGRATING ORAL
COMMUNITY
End: 2022-02-01

## 2021-02-01 RX ORDER — LANOLIN ALCOHOL/MO/W.PET/CERES
1000 CREAM (GRAM) TOPICAL DAILY
COMMUNITY
End: 2022-02-01

## 2021-02-01 SDOH — ECONOMIC STABILITY: TRANSPORTATION INSECURITY
IN THE PAST 12 MONTHS, HAS LACK OF TRANSPORTATION KEPT YOU FROM MEETINGS, WORK, OR FROM GETTING THINGS NEEDED FOR DAILY LIVING?: NOT ASKED

## 2021-02-01 SDOH — ECONOMIC STABILITY: TRANSPORTATION INSECURITY
IN THE PAST 12 MONTHS, HAS THE LACK OF TRANSPORTATION KEPT YOU FROM MEDICAL APPOINTMENTS OR FROM GETTING MEDICATIONS?: NOT ASKED

## 2021-02-01 SDOH — ECONOMIC STABILITY: INCOME INSECURITY: HOW HARD IS IT FOR YOU TO PAY FOR THE VERY BASICS LIKE FOOD, HOUSING, MEDICAL CARE, AND HEATING?: NOT HARD AT ALL

## 2021-02-01 SDOH — ECONOMIC STABILITY: FOOD INSECURITY: WITHIN THE PAST 12 MONTHS, THE FOOD YOU BOUGHT JUST DIDN'T LAST AND YOU DIDN'T HAVE MONEY TO GET MORE.: NEVER TRUE

## 2021-02-01 ASSESSMENT — ENCOUNTER SYMPTOMS
NAUSEA: 0
SHORTNESS OF BREATH: 0
VOMITING: 0
DIARRHEA: 0
ABDOMINAL PAIN: 0

## 2021-02-01 ASSESSMENT — PATIENT HEALTH QUESTIONNAIRE - PHQ9
SUM OF ALL RESPONSES TO PHQ QUESTIONS 1-9: 0
SUM OF ALL RESPONSES TO PHQ9 QUESTIONS 1 & 2: 0
SUM OF ALL RESPONSES TO PHQ QUESTIONS 1-9: 0
1. LITTLE INTEREST OR PLEASURE IN DOING THINGS: 0
SUM OF ALL RESPONSES TO PHQ QUESTIONS 1-9: 0

## 2021-02-01 NOTE — PROGRESS NOTES
2021    Cornelio Kirby (:  1955) is a 63 y.o. female, here for a preventive medicine evaluation. Patient Active Problem List   Diagnosis    Migraine    Insomnia    Fibromyalgia    HTN (hypertension)    GERD (gastroesophageal reflux disease)    Hiatal hernia    HLD (hyperlipidemia)     HTN  BP good at visit  On losartan and metoprolol    GERD  On prilosec  Might need a 2nd one depending on triggers    Fibromyalgia  Thinks is getting worse  Bilateral knees, arms, shoulders, hands  L knee in brace  Ibuprofen, Excedrin, Salonpas    Memory changes  Can forget short term memory  Goes to the [employer]  Does puzzles    Vitamin D deficiency  Is taking supplement    Diet: goes to work and gets busy- eats muffin or minis, banana; lunch and dinner; bad habit of m&m  Exercise: none- walks around at work; got a fitbit for [place]    Got a flu shot  Dental check up 2 weeks ago  Mammogram coming up    Review of Systems   Constitutional: Negative for activity change, appetite change, fatigue and fever. Respiratory: Negative for shortness of breath. Cardiovascular: Negative for chest pain. Gastrointestinal: Negative for abdominal pain, diarrhea, nausea and vomiting. GERD   Musculoskeletal:        Fibromyalgia   Neurological: Positive for headaches. Negative for dizziness and weakness. Memory changes       Prior to Visit Medications    Medication Sig Taking?  Authorizing Provider   magnesium (MAGNESIUM-OXIDE) 250 MG TABS tablet Take 250 mg by mouth daily Yes Historical Provider, MD   vitamin B-12 (CYANOCOBALAMIN) 1000 MCG tablet Take 1,000 mcg by mouth daily Yes Historical Provider, MD   Cholecalciferol (VITAMIN D3) 50 MCG (2000 UT) CAPS Take by mouth Yes Historical Provider, MD   Biotin 5000 MCG CAPS Take by mouth Yes Historical Provider, MD   metoprolol tartrate (LOPRESSOR) 50 MG tablet TAKE ONE TABLET BY MOUTH DAILY Yes TERRENCE Liu - CNP omeprazole (PRILOSEC) 20 MG delayed release capsule TAKE ONE TO TWO CAPSULES BY MOUTH DAILY AS NEEDED Yes Synetta Riddles, APRN - CNP   losartan (COZAAR) 100 MG tablet TAKE ONE TABLET BY MOUTH DAILY Yes Synetta Riddles, APRN - CNP   Omega-3 Fatty Acids (OMEGA-3 FISH OIL) 1000 MG CAPS Take by mouth Daily with supper  Yes Historical Provider, MD        Allergies   Allergen Reactions    Clarithromycin      Breathing and itching       Past Medical History:   Diagnosis Date    Fibromyalgia 1990's    GERD (gastroesophageal reflux disease)     Hiatal hernia     Dx on EGD before age 50.      HTN (hypertension)     Insomnia     Migraine        Past Surgical History:   Procedure Laterality Date    HYSTERECTOMY  2002 2/2 endometriosis    KNEE SURGERY Left 08/2016    SHOULDER SURGERY Left 07/2013    ALEXIS AND BSO      2002       Social History     Socioeconomic History    Marital status:      Spouse name: Not on file    Number of children: Not on file    Years of education: Not on file    Highest education level: Not on file   Occupational History    Not on file   Social Needs    Financial resource strain: Not hard at all   [employer] insecurity     Worry: Never true     Inability: Never true   [employer] needs     Medical: Not on file     Non-medical: Not on file   Tobacco Use    Smoking status: Never Smoker    Smokeless tobacco: Never Used   Substance and Sexual Activity    Alcohol use: Yes     Comment: socially; 1-2 times yearly    Drug use: No    Sexual activity: Not Currently   Lifestyle    Physical activity     Days per week: Not on file     Minutes per session: Not on file    Stress: Not on file   Relationships    Social connections     Talks on phone: Not on file     Gets together: Not on file     Attends Zoroastrian service: Not on file     Active member of club or organization: Not on file     Attends meetings of clubs or organizations: Not on file Relationship status: Not on file    Intimate partner violence     Fear of current or ex partner: Not on file     Emotionally abused: Not on file     Physically abused: Not on file     Forced sexual activity: Not on file   Other Topics Concern    Not on file   Social History Narrative    7/18/16     to patient Elli Olson - Dr. Orestes Han. Was not able to have kids. Irwin Adorno has 2 kids. Family History   Problem Relation Age of Onset    Heart Disease Mother     Cancer Maternal Cousin         breast        ADVANCE DIRECTIVE: N, <no information>    Vitals:    02/01/21 0739   BP: 118/68   Site: Left Upper Arm   Position: Sitting   Cuff Size: Large Adult   Pulse: 67   Temp: 97.3 °F (36.3 °C)   TempSrc: Infrared   SpO2: 97%   Weight: 212 lb (96.2 kg)   Height: 5' 7\" (1.702 m)     Estimated body mass index is 33.2 kg/m² as calculated from the following:    Height as of this encounter: 5' 7\" (1.702 m). Weight as of this encounter: 212 lb (96.2 kg). Physical Exam  Vitals signs reviewed. Constitutional:       Appearance: Normal appearance. HENT:      Head: Normocephalic. Right Ear: Tympanic membrane, ear canal and external ear normal.      Left Ear: Tympanic membrane, ear canal and external ear normal.      Nose: Nose normal.      Mouth/Throat:      Lips: Pink. Mouth: Mucous membranes are moist.      Pharynx: Oropharynx is clear. Uvula midline. Cardiovascular:      Rate and Rhythm: Normal rate and regular rhythm. Pulses: Normal pulses. Heart sounds: Normal heart sounds, S1 normal and S2 normal.   Pulmonary:      Effort: Pulmonary effort is normal.      Breath sounds: Normal breath sounds and air entry. Abdominal:      Palpations: Abdomen is soft. Tenderness: There is no abdominal tenderness. Neurological:      Mental Status: She is alert. Psychiatric:         Mood and Affect: Mood normal.         No flowsheet data found.     Lab Results   Component Value Date Stable;  Continue current regimen. -     omeprazole (PRILOSEC) 20 MG delayed release capsule; TAKE ONE TO TWO CAPSULES BY MOUTH DAILY AS NEEDED, Disp-60 capsule, R-5Normal  4. Hyperlipidemia, unspecified hyperlipidemia type  Stable;  Labs ordered. -     Lipid Panel; Future  -     Comprehensive Metabolic Panel; Future  5. Fibromyalgia  Stable;  Continue current regimen. If becomes ineffective will try prescription treatment. 6. Vitamin D deficiency  Stable;  Continue current regimen. 7. Screening for diabetes mellitus  Stable;  Labs ordered. -     Hemoglobin A1C; Future  8. Screening for colon cancer  Stable;  Patient agreeable to FIT test.  -     POCT Fecal Immunochemical Test (FIT); Future      Return in about 1 year (around 2/1/2022). An electronic signature was used to authenticate this note.     --TERRENCE Hobbs - CNP on 2/1/2021 at 9:31 AM

## 2021-02-02 LAB
ESTIMATED AVERAGE GLUCOSE: 119.8 MG/DL
HBA1C MFR BLD: 5.8 %

## 2021-02-18 DIAGNOSIS — N18.9 CHRONIC KIDNEY DISEASE, UNSPECIFIED CKD STAGE: Primary | ICD-10-CM

## 2021-03-01 ENCOUNTER — HOSPITAL ENCOUNTER (OUTPATIENT)
Dept: ULTRASOUND IMAGING | Age: 66
Discharge: HOME | End: 2021-03-01
Payer: COMMERCIAL

## 2021-03-01 DIAGNOSIS — N18.9 CHRONIC KIDNEY DISEASE, UNSPECIFIED CKD STAGE: ICD-10-CM

## 2021-03-01 PROCEDURE — 76770 US EXAM ABDO BACK WALL COMP: CPT

## 2021-03-10 ENCOUNTER — TELEPHONE (OUTPATIENT)
Dept: FAMILY MEDICINE CLINIC | Age: 66
End: 2021-03-10

## 2021-03-10 NOTE — TELEPHONE ENCOUNTER
Subject: Results Request     QUESTIONS   Which lab or imaging result is the patient calling about? US RENAL   COMPLETE   Which provider ordered the test? Chalino Poon   At what location was the test performed? Date the test was performed? 2021-03-01   Additional Information for Provider? pt stated she has not had a call back   about imaging   ---------------------------------------------------------------------------   --------------   CALL BACK INFO   What is the best way for the office to contact you? OK to leave message on   voicemail   Preferred Call Back Phone Number?  [phone]

## 2021-03-10 NOTE — TELEPHONE ENCOUNTER
----- Message from Agata Beckman sent at 3/10/2021  7:22 AM EST -----  Subject: Results Request    QUESTIONS  Which lab or imaging result is the patient calling about? Ultra Sound   Which provider ordered the test? Courtney Vaughan   At what location was the test performed? Date the test was performed? 2021-03-01  Additional Information for Provider? Patient had an ultra sound on both   kidneys and would like to know her results. ---------------------------------------------------------------------------  --------------  Jass PULIDO  What is the best way for the office to contact you? OK to leave message on   voicemail  Preferred Call Back Phone Number?  [phone]

## 2021-03-11 DIAGNOSIS — N18.9 CHRONIC KIDNEY DISEASE, UNSPECIFIED CKD STAGE: Primary | ICD-10-CM

## 2021-03-11 PROCEDURE — 81002 URINALYSIS NONAUTO W/O SCOPE: CPT | Performed by: NURSE PRACTITIONER

## 2021-03-12 DIAGNOSIS — R39.9 UTI SYMPTOMS: Primary | ICD-10-CM

## 2021-03-12 LAB
BILIRUBIN, POC: NEGATIVE
BLOOD URINE, POC: NEGATIVE
CLARITY, POC: CLEAR
COLOR, POC: YELLOW
GLUCOSE URINE, POC: NEGATIVE
KETONES, POC: NEGATIVE
LEUKOCYTE EST, POC: NEGATIVE
NITRITE, POC: NEGATIVE
PH, POC: 5.5
PROTEIN, POC: NEGATIVE
SPECIFIC GRAVITY, POC: >=1.03
UROBILINOGEN, POC: NORMAL

## 2021-03-14 LAB — URINE CULTURE, ROUTINE: NORMAL

## 2021-03-15 DIAGNOSIS — N28.89 CALIECTASIS: Primary | ICD-10-CM

## 2021-09-03 DIAGNOSIS — I10 ESSENTIAL HYPERTENSION: ICD-10-CM

## 2021-09-03 RX ORDER — LOSARTAN POTASSIUM 100 MG/1
TABLET ORAL
Qty: 90 TABLET | Refills: 1 | Status: SHIPPED | OUTPATIENT
Start: 2021-09-03 | End: 2022-03-09

## 2021-09-19 DIAGNOSIS — I10 ESSENTIAL HYPERTENSION: ICD-10-CM

## 2021-09-20 RX ORDER — METOPROLOL TARTRATE 50 MG/1
TABLET, FILM COATED ORAL
Qty: 90 TABLET | Refills: 1 | Status: SHIPPED | OUTPATIENT
Start: 2021-09-20 | End: 2022-03-14

## 2021-09-20 NOTE — TELEPHONE ENCOUNTER
Last OV 2/1/2021   Next OV Visit date not found    Requested Prescriptions     Pending Prescriptions Disp Refills    metoprolol tartrate (LOPRESSOR) 50 MG tablet [Pharmacy Med Name: METOPROLOL TARTRATE 50 MG TAB] 90 tablet 1     Sig: TAKE ONE TABLET BY MOUTH DAILY

## 2022-02-01 ENCOUNTER — OFFICE VISIT (OUTPATIENT)
Dept: FAMILY MEDICINE CLINIC | Age: 67
End: 2022-02-01
Payer: COMMERCIAL

## 2022-02-01 VITALS
HEIGHT: 67 IN | DIASTOLIC BLOOD PRESSURE: 76 MMHG | BODY MASS INDEX: 32.65 KG/M2 | HEART RATE: 72 BPM | WEIGHT: 208 LBS | OXYGEN SATURATION: 96 % | SYSTOLIC BLOOD PRESSURE: 130 MMHG

## 2022-02-01 DIAGNOSIS — K21.9 GASTROESOPHAGEAL REFLUX DISEASE WITHOUT ESOPHAGITIS: ICD-10-CM

## 2022-02-01 DIAGNOSIS — M79.7 FIBROMYALGIA: ICD-10-CM

## 2022-02-01 DIAGNOSIS — I10 ESSENTIAL HYPERTENSION: ICD-10-CM

## 2022-02-01 DIAGNOSIS — E78.5 HYPERLIPIDEMIA, UNSPECIFIED HYPERLIPIDEMIA TYPE: ICD-10-CM

## 2022-02-01 DIAGNOSIS — L65.9 HAIR LOSS: ICD-10-CM

## 2022-02-01 DIAGNOSIS — Z13.1 SCREENING FOR DIABETES MELLITUS: ICD-10-CM

## 2022-02-01 DIAGNOSIS — Z00.00 WELL ADULT EXAM: Primary | ICD-10-CM

## 2022-02-01 LAB
A/G RATIO: 1.6 (ref 1.1–2.2)
ALBUMIN SERPL-MCNC: 4.6 G/DL (ref 3.4–5)
ALP BLD-CCNC: 83 U/L (ref 40–129)
ALT SERPL-CCNC: 14 U/L (ref 10–40)
ANION GAP SERPL CALCULATED.3IONS-SCNC: 17 MMOL/L (ref 3–16)
AST SERPL-CCNC: 14 U/L (ref 15–37)
BILIRUB SERPL-MCNC: 0.7 MG/DL (ref 0–1)
BUN BLDV-MCNC: 23 MG/DL (ref 7–20)
CALCIUM SERPL-MCNC: 10 MG/DL (ref 8.3–10.6)
CHLORIDE BLD-SCNC: 100 MMOL/L (ref 99–110)
CHOLESTEROL, TOTAL: 242 MG/DL (ref 0–199)
CO2: 23 MMOL/L (ref 21–32)
CREAT SERPL-MCNC: 1 MG/DL (ref 0.6–1.2)
FERRITIN: 96.7 NG/ML (ref 15–150)
GFR AFRICAN AMERICAN: >60
GFR NON-AFRICAN AMERICAN: 56
GLUCOSE BLD-MCNC: 100 MG/DL (ref 70–99)
HCT VFR BLD CALC: 42.5 % (ref 36–48)
HDLC SERPL-MCNC: 70 MG/DL (ref 40–60)
HEMOGLOBIN: 14.1 G/DL (ref 12–16)
IRON % SATURATION: 23 % (ref 15–50)
IRON: 81 UG/DL (ref 37–145)
LDL CHOLESTEROL CALCULATED: 142 MG/DL
MCH RBC QN AUTO: 30.4 PG (ref 26–34)
MCHC RBC AUTO-ENTMCNC: 33.3 G/DL (ref 31–36)
MCV RBC AUTO: 91.3 FL (ref 80–100)
PDW BLD-RTO: 13.3 % (ref 12.4–15.4)
PLATELET # BLD: 224 K/UL (ref 135–450)
PMV BLD AUTO: 9.6 FL (ref 5–10.5)
POTASSIUM SERPL-SCNC: 4.2 MMOL/L (ref 3.5–5.1)
RBC # BLD: 4.65 M/UL (ref 4–5.2)
SODIUM BLD-SCNC: 140 MMOL/L (ref 136–145)
TOTAL IRON BINDING CAPACITY: 353 UG/DL (ref 260–445)
TOTAL PROTEIN: 7.4 G/DL (ref 6.4–8.2)
TRIGL SERPL-MCNC: 149 MG/DL (ref 0–150)
TSH SERPL DL<=0.05 MIU/L-ACNC: 1.82 UIU/ML (ref 0.27–4.2)
VITAMIN D 25-HYDROXY: 22.8 NG/ML
VLDLC SERPL CALC-MCNC: 30 MG/DL
WBC # BLD: 4.6 K/UL (ref 4–11)

## 2022-02-01 PROCEDURE — 99396 PREV VISIT EST AGE 40-64: CPT | Performed by: NURSE PRACTITIONER

## 2022-02-01 RX ORDER — DULOXETIN HYDROCHLORIDE 30 MG/1
30 CAPSULE, DELAYED RELEASE ORAL DAILY
Qty: 30 CAPSULE | Refills: 3 | Status: SHIPPED | OUTPATIENT
Start: 2022-02-01

## 2022-02-01 ASSESSMENT — ANXIETY QUESTIONNAIRES
GAD7 TOTAL SCORE: 1
3. WORRYING TOO MUCH ABOUT DIFFERENT THINGS: 0-NOT AT ALL
2. NOT BEING ABLE TO STOP OR CONTROL WORRYING: 0-NOT AT ALL
1. FEELING NERVOUS, ANXIOUS, OR ON EDGE: 0-NOT AT ALL
4. TROUBLE RELAXING: 0-NOT AT ALL
5. BEING SO RESTLESS THAT IT IS HARD TO SIT STILL: 0-NOT AT ALL
7. FEELING AFRAID AS IF SOMETHING AWFUL MIGHT HAPPEN: 0-NOT AT ALL
6. BECOMING EASILY ANNOYED OR IRRITABLE: 1-SEVERAL DAYS

## 2022-02-01 ASSESSMENT — PATIENT HEALTH QUESTIONNAIRE - PHQ9
SUM OF ALL RESPONSES TO PHQ9 QUESTIONS 1 & 2: 0
4. FEELING TIRED OR HAVING LITTLE ENERGY: 0
1. LITTLE INTEREST OR PLEASURE IN DOING THINGS: 0
SUM OF ALL RESPONSES TO PHQ QUESTIONS 1-9: 2
8. MOVING OR SPEAKING SO SLOWLY THAT OTHER PEOPLE COULD HAVE NOTICED. OR THE OPPOSITE, BEING SO FIGETY OR RESTLESS THAT YOU HAVE BEEN MOVING AROUND A LOT MORE THAN USUAL: 0
SUM OF ALL RESPONSES TO PHQ QUESTIONS 1-9: 2
9. THOUGHTS THAT YOU WOULD BE BETTER OFF DEAD, OR OF HURTING YOURSELF: 0
3. TROUBLE FALLING OR STAYING ASLEEP: 1
SUM OF ALL RESPONSES TO PHQ QUESTIONS 1-9: 2
10. IF YOU CHECKED OFF ANY PROBLEMS, HOW DIFFICULT HAVE THESE PROBLEMS MADE IT FOR YOU TO DO YOUR WORK, TAKE CARE OF THINGS AT HOME, OR GET ALONG WITH OTHER PEOPLE: 0
2. FEELING DOWN, DEPRESSED OR HOPELESS: 0
SUM OF ALL RESPONSES TO PHQ QUESTIONS 1-9: 2
5. POOR APPETITE OR OVEREATING: 1
6. FEELING BAD ABOUT YOURSELF - OR THAT YOU ARE A FAILURE OR HAVE LET YOURSELF OR YOUR FAMILY DOWN: 0
7. TROUBLE CONCENTRATING ON THINGS, SUCH AS READING THE NEWSPAPER OR WATCHING TELEVISION: 0

## 2022-02-01 NOTE — PROGRESS NOTES
2022    Flynn Burkitt (:  1955) is a 61 y.o. female, here for a preventive medicine evaluation. Patient Active Problem List   Diagnosis    Migraine    Insomnia    Fibromyalgia    HTN (hypertension)    GERD (gastroesophageal reflux disease)    Hiatal hernia    HLD (hyperlipidemia)     Is losing hair  Started end of November into December  Does not wash hair qday   said she is getting thinner  Is losing strands    Hx of constipation  Has been trying to take citrucel  Drinks plenty of water  Is back to regular for her  Stopped taking vitamins    HYPERTENSION  Blood pressure is good at visit  Taking metoprolol and losartan    GERD  Takes omeprazole- is working well  Continues to eat at [employer] and [employer]  Is starting to get worse  Hips, hands, arms  Sometimes there are nights where she cannot walk up stairs  Has never taken any medication    Had a flu shot -     Review of Systems    Prior to Visit Medications    Medication Sig Taking? Authorizing Provider   DULoxetine (CYMBALTA) 30 MG extended release capsule Take 1 capsule by mouth daily Yes TERRENCE Peralta CNP   metoprolol tartrate (LOPRESSOR) 50 MG tablet TAKE ONE TABLET BY MOUTH DAILY Yes TERRENCE Peralta CNP   losartan (COZAAR) 100 MG tablet TAKE ONE TABLET BY MOUTH DAILY Yes TERRENCE Peralta CNP   omeprazole (PRILOSEC) 20 MG delayed release capsule TAKE ONE TO TWO CAPSULES BY MOUTH DAILY AS NEEDED Yes TERRENCE Peralta CNP        Allergies   Allergen Reactions    Clarithromycin      Breathing and itching       Past Medical History:   Diagnosis Date    Fibromyalgia     GERD (gastroesophageal reflux disease)     Hiatal hernia     Dx on EGD before age 50.      HTN (hypertension)     Insomnia     Migraine        Past Surgical History:   Procedure Laterality Date    HYSTERECTOMY   endometriosis    KNEE SURGERY Left 2016  SHOULDER SURGERY Left 07/2013    LAEXIS AND BSDAWIT      2002       Social History     Socioeconomic History    Marital status:      Spouse name: Not on file    Number of children: Not on file    Years of education: Not on file    Highest education level: Not on file   Occupational History    Not on file   Tobacco Use    Smoking status: Never Smoker    Smokeless tobacco: Never Used   Substance and Sexual Activity    Alcohol use: Yes     Comment: socially; 1-2 times yearly    Drug use: No    Sexual activity: Not Currently   Other Topics Concern    Not on file   Social History Narrative    7/18/16     to patient Dalia Lover - Dr. Carlo Waldrop. Was not able to have kids. Jeffry Huffman has 2 kids. Social Determinants of Health     Financial Resource Strain: Low Risk     Difficulty of Paying Living Expenses: Not hard at all   Food Insecurity: No Food Insecurity    Worried About Running Out of Food in the Last Year: Never true    [place]ia of Food in the Last Year: Never true   Transportation Needs:     Lack of Transportation (Medical): Not on file    Lack of Transportation (Non-Medical):  Not on file   Physical Activity:     Days of Exercise per Week: Not on file    Minutes of Exercise per Session: Not on file   Stress:     Feeling of Stress : Not on file   Social Connections:     Frequency of Communication with Friends and Family: Not on file    Frequency of Social Gatherings with Friends and Family: Not on file    Attends Sikh Services: Not on file    Active Member of Clubs or Organizations: Not on file    Attends Club or Organization Meetings: Not on file    Marital Status: Not on file   Intimate Partner Violence:     Fear of Current or Ex-Partner: Not on file    Emotionally Abused: Not on file    Physically Abused: Not on file    Sexually Abused: Not on file   Housing Stability:     Unable to Pay for Housing in the Last Year: Not on file    Number of [place] in the Last Year: Not on file    Unstable Housing in the Last Year: Not on file        Family History   Problem Relation Age of Onset    Heart Disease Mother     Cancer Maternal Cousin         breast        ADVANCE DIRECTIVE: N, <no information>    Vitals:    02/01/22 0741   BP: 130/76   Site: Left Upper Arm   Position: Sitting   Cuff Size: Medium Adult   Pulse: 72   SpO2: 96%   Weight: 208 lb (94.3 kg)   Height: 5' 7\" (1.702 m)     Estimated body mass index is 32.58 kg/m² as calculated from the following:    Height as of this encounter: 5' 7\" (1.702 m). Weight as of this encounter: 208 lb (94.3 kg). Physical Exam  Vitals reviewed. Constitutional:       Appearance: Normal appearance. HENT:      Head: Normocephalic. Right Ear: Tympanic membrane, ear canal and external ear normal.      Left Ear: Tympanic membrane, ear canal and external ear normal.      Mouth/Throat:      Lips: Pink. Mouth: Mucous membranes are moist.      Pharynx: Oropharynx is clear. Uvula midline. Cardiovascular:      Rate and Rhythm: Normal rate and regular rhythm. Pulses: Normal pulses. Heart sounds: Normal heart sounds, S1 normal and S2 normal.   Pulmonary:      Effort: Pulmonary effort is normal.      Breath sounds: Normal breath sounds and air entry. Abdominal:      Palpations: Abdomen is soft. Tenderness: There is no abdominal tenderness. Musculoskeletal:      Right lower leg: No edema. Left lower leg: No edema. Neurological:      Mental Status: She is alert. Psychiatric:         Mood and Affect: Mood normal.         No flowsheet data found.     Lab Results   Component Value Date    CHOL 252 02/01/2021    CHOL 229 01/27/2020    CHOL 234 02/11/2019    TRIG 249 02/01/2021    TRIG 134 01/27/2020    TRIG 165 02/11/2019    HDL 63 02/01/2021    HDL 70 01/27/2020    HDL 68 02/11/2019    LDLCALC 139 02/01/2021    LDLCALC 132 01/27/2020    LDLCALC 133 02/11/2019    GLUCOSE 97 02/01/2021    LABA1C 5.8 02/01/2021 LABA1C 5.7 01/27/2020       The 10-year ASCVD risk score (Aureliano Reynolds, et al., 2013) is: 7.4%    Values used to calculate the score:      Age: 61 years      Sex: Female      Is Non- : No      Diabetic: No      Tobacco smoker: No      Systolic Blood Pressure: 216 mmHg      Is BP treated: Yes      HDL Cholesterol: 63 mg/dL      Total Cholesterol: 252 mg/dL    Immunization History   Administered Date(s) Administered    COVID-19, J&J, PF, 0.5 mL 04/01/2021, 09/07/2021, 12/02/2021    Tdap (Boostrix, Adacel) 07/18/2016    Zoster Live (Zostavax) 11/06/2017    Zoster Recombinant (Shingrix) 01/27/2020, 02/01/2021       Health Maintenance   Topic Date Due    Colon Cancer Screen FIT/FOBT  03/01/2020    Flu vaccine (1) Never done    Potassium monitoring  02/01/2022    Creatinine monitoring  02/01/2022    A1C test (Diabetic or Prediabetic)  02/01/2022    Depression Screen  02/01/2023    Breast cancer screen  02/08/2023    Lipid screen  02/01/2026    DTaP/Tdap/Td vaccine (2 - Td or Tdap) 07/18/2026    Shingles Vaccine  Completed    COVID-19 Vaccine  Completed    Hepatitis C screen  Completed    HIV screen  Completed    Hepatitis A vaccine  Aged Out    Hepatitis B vaccine  Aged Out    Hib vaccine  Aged Out    Meningococcal (ACWY) vaccine  Aged Out    Pneumococcal 0-64 years Vaccine  Aged Out          ASSESSMENT/PLAN:  1. Well adult exam  Stable;  60 yo wellness exam.  Continue to eat well and exercise. Mammogram is scheduled. 2. Hair loss  Stable;  Labs ordered. -     Vitamin D 25 Hydroxy; Future  -     CBC; Future  -     TSH without Reflex; Future  -     Iron and TIBC; Future  -     Ferritin; Future  3. Essential hypertension  Stable;  Continue current regimen.  -     Lipid Panel; Future  -     Comprehensive Metabolic Panel; Future  4. Gastroesophageal reflux disease without esophagitis  Stable;  Continue current regimen. 5. Fibromyalgia  Stable;  Begin cymbalta.   Risks and benefits discussed. -     DULoxetine (CYMBALTA) 30 MG extended release capsule; Take 1 capsule by mouth daily, Disp-30 capsule, R-3Normal  6. Hyperlipidemia, unspecified hyperlipidemia type  Stable;  Recheck. -     Lipid Panel; Future  -     Comprehensive Metabolic Panel; Future  7. Screening for diabetes mellitus  Stable;  Recheck. -     Hemoglobin A1C; Future      Return in about 4 weeks (around 3/1/2022). An electronic signature was used to authenticate this note.     --TERRENCE Schmitt - CNP on 2/1/2022 at 8:52 AM

## 2022-02-02 LAB
ESTIMATED AVERAGE GLUCOSE: 116.9 MG/DL
HBA1C MFR BLD: 5.7 %

## 2022-02-07 ENCOUNTER — TELEPHONE (OUTPATIENT)
Dept: FAMILY MEDICINE CLINIC | Age: 67
End: 2022-02-07

## 2022-02-07 NOTE — TELEPHONE ENCOUNTER
Pt was informed of blood work from 02/01/2022. Pt stated she started taking the medication below on Tuesday and she takes it at night due to not eating much in the morning. Pt stated she has been nauseated, shaky and experiencing really bad headaches after taking it.      Please advise pt     Phone # [phone]    DULoxetine (CYMBALTA) 30 MG extended release capsule

## 2022-02-17 LAB — MAMMOGRAPHY, EXTERNAL: NORMAL

## 2022-03-09 DIAGNOSIS — I10 ESSENTIAL HYPERTENSION: ICD-10-CM

## 2022-03-09 RX ORDER — LOSARTAN POTASSIUM 100 MG/1
TABLET ORAL
Qty: 90 TABLET | Refills: 1 | Status: SHIPPED | OUTPATIENT
Start: 2022-03-09 | End: 2022-09-07

## 2022-03-09 NOTE — TELEPHONE ENCOUNTER
Medication:   Requested Prescriptions     Pending Prescriptions Disp Refills    losartan (COZAAR) 100 MG tablet [Pharmacy Med Name: LOSARTAN POTASSIUM 100 MG TAB] 90 tablet 1     Sig: TAKE ONE TABLET BY MOUTH DAILY        Last Filled:      Patient Phone Number: [phone] (home)     Last appt: 2/1/2022   Next appt: Visit date not found    Last OARRS: No flowsheet data found.

## 2022-03-12 DIAGNOSIS — I10 ESSENTIAL HYPERTENSION: ICD-10-CM

## 2022-03-12 DIAGNOSIS — K21.9 GASTROESOPHAGEAL REFLUX DISEASE WITHOUT ESOPHAGITIS: ICD-10-CM

## 2022-03-14 RX ORDER — OMEPRAZOLE 20 MG/1
CAPSULE, DELAYED RELEASE ORAL
Qty: 60 CAPSULE | Refills: 5 | Status: SHIPPED | OUTPATIENT
Start: 2022-03-14

## 2022-03-14 RX ORDER — METOPROLOL TARTRATE 50 MG/1
TABLET, FILM COATED ORAL
Qty: 90 TABLET | Refills: 1 | Status: SHIPPED | OUTPATIENT
Start: 2022-03-14 | End: 2022-09-07

## 2022-03-14 NOTE — TELEPHONE ENCOUNTER
Medication:   Requested Prescriptions     Pending Prescriptions Disp Refills    metoprolol tartrate (LOPRESSOR) 50 MG tablet [Pharmacy Med Name: METOPROLOL TARTRATE 50 MG TAB] 90 tablet 1     Sig: TAKE ONE TABLET BY MOUTH DAILY    omeprazole (PRILOSEC) 20 MG delayed release capsule [Pharmacy Med Name: OMEPRAZOLE DR 20 MG CAPSULE] 60 capsule 5     Sig: TAKE ONE TO TWO CAPSULES BY MOUTH EVERY DAY AS NEEDED        Last Filled:  09/20/2021    Patient Phone Number: [phone] (home)     Last appt: 2/1/2022   Next appt: Visit date not found    Last OARRS: No flowsheet data found.

## 2022-09-07 DIAGNOSIS — I10 ESSENTIAL HYPERTENSION: ICD-10-CM

## 2022-09-07 RX ORDER — LOSARTAN POTASSIUM 100 MG/1
TABLET ORAL
Qty: 90 TABLET | Refills: 0 | Status: SHIPPED | OUTPATIENT
Start: 2022-09-07

## 2022-09-07 RX ORDER — METOPROLOL TARTRATE 50 MG/1
TABLET, FILM COATED ORAL
Qty: 90 TABLET | Refills: 0 | Status: SHIPPED | OUTPATIENT
Start: 2022-09-07

## 2022-09-07 NOTE — TELEPHONE ENCOUNTER
Medication:   Requested Prescriptions     Pending Prescriptions Disp Refills    metoprolol tartrate (LOPRESSOR) 50 MG tablet [Pharmacy Med Name: METOPROLOL TARTRATE 50 MG TAB] 90 tablet 1     Sig: TAKE ONE TABLET BY MOUTH DAILY    losartan (COZAAR) 100 MG tablet [Pharmacy Med Name: LOSARTAN POTASSIUM 100 MG TAB] 90 tablet 1     Sig: TAKE ONE TABLET BY MOUTH DAILY        Last Filled:      Patient Phone Number: [phone] (home)     Last appt: 2/1/2022   Next appt: Visit date not found    Last OARRS: No flowsheet data found.

## 2022-10-03 ENCOUNTER — OFFICE VISIT (OUTPATIENT)
Dept: FAMILY MEDICINE CLINIC | Age: 67
End: 2022-10-03
Payer: COMMERCIAL

## 2022-10-03 VITALS
HEART RATE: 108 BPM | BODY MASS INDEX: 31.23 KG/M2 | WEIGHT: 199 LBS | TEMPERATURE: 98.4 F | HEIGHT: 67 IN | SYSTOLIC BLOOD PRESSURE: 125 MMHG | OXYGEN SATURATION: 98 % | DIASTOLIC BLOOD PRESSURE: 80 MMHG

## 2022-10-03 DIAGNOSIS — M25.552 LEFT HIP PAIN: ICD-10-CM

## 2022-10-03 DIAGNOSIS — M25.852 LEFT HIP IMPINGEMENT SYNDROME: Primary | ICD-10-CM

## 2022-10-03 PROCEDURE — 1123F ACP DISCUSS/DSCN MKR DOCD: CPT | Performed by: NURSE PRACTITIONER

## 2022-10-03 PROCEDURE — 96372 THER/PROPH/DIAG INJ SC/IM: CPT | Performed by: NURSE PRACTITIONER

## 2022-10-03 PROCEDURE — 99214 OFFICE O/P EST MOD 30 MIN: CPT | Performed by: NURSE PRACTITIONER

## 2022-10-03 RX ORDER — KETOROLAC TROMETHAMINE 30 MG/ML
30 INJECTION, SOLUTION INTRAMUSCULAR; INTRAVENOUS ONCE
Status: COMPLETED | OUTPATIENT
Start: 2022-10-03 | End: 2022-10-03

## 2022-10-03 RX ORDER — KETOROLAC TROMETHAMINE 30 MG/ML
30 INJECTION, SOLUTION INTRAMUSCULAR; INTRAVENOUS ONCE
Qty: 1 ML | Refills: 0 | Status: CANCELLED
Start: 2022-10-03 | End: 2022-10-03

## 2022-10-03 RX ORDER — METHYLPREDNISOLONE 4 MG/1
TABLET ORAL
Qty: 1 KIT | Refills: 0 | Status: SHIPPED | OUTPATIENT
Start: 2022-10-03 | End: 2022-10-09

## 2022-10-03 RX ADMIN — KETOROLAC TROMETHAMINE 30 MG: 30 INJECTION, SOLUTION INTRAMUSCULAR; INTRAVENOUS at 11:24

## 2022-10-03 ASSESSMENT — ENCOUNTER SYMPTOMS
VOMITING: 0
CHEST TIGHTNESS: 0
EYE PAIN: 0
RHINORRHEA: 0
COUGH: 0
COLOR CHANGE: 0
EYE REDNESS: 0
WHEEZING: 0
SORE THROAT: 0
PHOTOPHOBIA: 0
CONSTIPATION: 0
STRIDOR: 0
BACK PAIN: 0
SINUS PRESSURE: 0
ABDOMINAL PAIN: 0
SINUS PAIN: 0
DIARRHEA: 0
EYE DISCHARGE: 0
VOICE CHANGE: 0
SHORTNESS OF BREATH: 0
BLOOD IN STOOL: 0
TROUBLE SWALLOWING: 0
CHOKING: 0
NAUSEA: 0
EYE ITCHING: 0

## 2022-10-03 NOTE — PROGRESS NOTES
Chief Complaint   Patient presents with    Pain       /80 (Site: Right Upper Arm, Position: Sitting, Cuff Size: Medium Adult)   Pulse (!) 108   Temp 98.4 °F (36.9 °C) (Temporal)   Ht 5' 7\" (1.702 m)   Wt 199 lb (90.3 kg)   SpO2 98%   BMI 31.17 kg/m²     HPI:  Tracie Ortiz is a 74 y.o. (: 1955) here today   for   HPI    Patient's medications, allergies, past medical, surgical, social and family histories were reviewed and updated as appropriate. ED follow up:    Patient went to [facility] on 10/2/2022 for left hip pain, has been intermittent for past 10 years. Pt was given  5 mg Oxycodone for break though pain and flexeril. Advised to follow up with PCP, XRAYS showed mild arthritis. Suspected pulled muscle, unsure of cause. Last weekend was wearing heels more. Had massage Thursday and felt better. Saturday evening pain was bad. She has tried Flexaril and oxy 5 mg which did not help. Pain is Left SI joint, not rotating. Shooting. Movements hurt. Suspect SI joint impingement. ROS:  Review of Systems   Constitutional:  Positive for activity change. Negative for appetite change, chills, diaphoresis, fatigue, fever and unexpected weight change. HENT:  Negative for congestion, ear discharge, ear pain, hearing loss, nosebleeds, postnasal drip, rhinorrhea, sinus pressure, sinus pain, sneezing, sore throat, tinnitus, trouble swallowing and voice change. Eyes:  Negative for photophobia, pain, discharge, redness and itching. Respiratory:  Negative for cough, choking, chest tightness, shortness of breath, wheezing and stridor. Cardiovascular:  Negative for chest pain, palpitations and leg swelling. Gastrointestinal:  Negative for abdominal pain, blood in stool, constipation, diarrhea, nausea and vomiting. Endocrine: Negative for cold intolerance, heat intolerance, polydipsia and polyuria.    Genitourinary:  Negative for difficulty urinating, dysuria, enuresis, flank pain, frequency, hematuria and urgency. Musculoskeletal:  Positive for arthralgias and gait problem. Negative for back pain, joint swelling, neck pain and neck stiffness. Left joint pain   Skin:  Negative for color change, pallor, rash and wound. Allergic/Immunologic: Negative for environmental allergies and food allergies. Neurological:  Negative for dizziness, tremors, syncope, speech difficulty, weakness, light-headedness, numbness and headaches. Hematological:  Negative for adenopathy. Does not bruise/bleed easily. Psychiatric/Behavioral:  Negative for agitation, behavioral problems, confusion, decreased concentration, dysphoric mood, hallucinations, self-injury, sleep disturbance and suicidal ideas. The patient is not nervous/anxious and is not hyperactive. Prior to Visit Medications    Medication Sig Taking? Authorizing Provider   methylPREDNISolone (MEDROL DOSEPACK) 4 MG tablet Take by mouth. Yes TERRENCE Alvares CNP   metoprolol tartrate (LOPRESSOR) 50 MG tablet TAKE ONE TABLET BY MOUTH DAILY  TERRENCE Alvares CNP   losartan (COZAAR) 100 MG tablet TAKE ONE TABLET BY MOUTH DAILY  TERRENCE Alvares CNP   omeprazole (PRILOSEC) 20 MG delayed release capsule TAKE ONE TO TWO CAPSULES BY MOUTH EVERY DAY AS NEEDED  TERRENCE Coello CNP   DULoxetine (CYMBALTA) 30 MG extended release capsule Take 1 capsule by mouth daily  TERRENCE Coello CNP       Allergies   Allergen Reactions    Clarithromycin      Breathing and itching       OBJECTIVE:      BP Readings from Last 2 Encounters:   10/03/22 125/80   02/01/22 130/76       Wt Readings from Last 3 Encounters:   10/03/22 199 lb (90.3 kg)   02/01/22 208 lb (94.3 kg)   02/01/21 212 lb (96.2 kg)       Physical Exam  Vitals reviewed. Constitutional:       General: She is not in acute distress. Appearance: Normal appearance. She is well-developed. HENT:      Head: Normocephalic and atraumatic.       Right Ear: Hearing and external ear normal.      Left Ear: Hearing and external ear normal.      Nose:      Right Sinus: No maxillary sinus tenderness or frontal sinus tenderness. Left Sinus: No maxillary sinus tenderness or frontal sinus tenderness. Eyes:      General:         Right eye: No discharge. Left eye: No discharge. Conjunctiva/sclera: Conjunctivae normal.   Neck:      Thyroid: No thyromegaly. Vascular: No JVD. Trachea: No tracheal deviation. Cardiovascular:      Rate and Rhythm: Normal rate and regular rhythm. Heart sounds: Normal heart sounds. No murmur heard. No friction rub. Pulmonary:      Effort: Pulmonary effort is normal. No respiratory distress. Breath sounds: Normal breath sounds. No stridor. No decreased breath sounds, wheezing, rhonchi or rales. Musculoskeletal:         General: Tenderness present. Cervical back: Normal range of motion. Legs:    Lymphadenopathy:      Cervical: No cervical adenopathy. Skin:     General: Skin is warm and dry. Capillary Refill: Capillary refill takes less than 2 seconds. Findings: No rash. Neurological:      Mental Status: She is alert and oriented to person, place, and time. Sensory: Sensation is intact. Motor: Motor function is intact. Coordination: Coordination normal.   Psychiatric:         Attention and Perception: Attention and perception normal.         Mood and Affect: Mood normal.         Speech: Speech normal.         Behavior: Behavior normal. Behavior is cooperative. Thought Content: Thought content normal.         Cognition and Memory: Cognition normal.         Judgment: Judgment normal.     ASSESSMENT/PLAN:    1. Left hip impingement syndrome    - [facility] - Navya Siu MD, Orthopedics and Sports Medicine (Hip; Knee; Shoulder), [place]  - methylPREDNISolone (MEDROL DOSEPACK) 4 MG tablet; Take by mouth. Dispense: 1 kit;  Refill: 0  - ketorolac (TORADOL) injection 30 mg    2. Left hip pain    - [facility] - Ashley Benitez MD, Orthopedics and Sports Medicine (Hip; Knee; Shoulder), [place]  - methylPREDNISolone (MEDROL DOSEPACK) 4 MG tablet; Take by mouth. Dispense: 1 kit; Refill: 0  - ketorolac (TORADOL) injection 30 mg    Will have her wait 15 min to be observed after getting injection. She will call ortho and follow up if not improving. Follow up if symptoms do not improve or worsen. If the patient becomes short of breath go straight to the ER or call 911.

## 2022-12-06 ENCOUNTER — COMMUNITY OUTREACH (OUTPATIENT)
Dept: FAMILY MEDICINE CLINIC | Age: 67
End: 2022-12-06

## 2022-12-06 NOTE — PROGRESS NOTES
Patient's HM shows they are overdue for Mammogram, Colorectal Screening and Dexa  CareEverywhere and  files searched.  updated with 2022 Mammogram and 2008 Dexa.

## 2022-12-22 ENCOUNTER — TELEPHONE (OUTPATIENT)
Dept: FAMILY MEDICINE CLINIC | Age: 67
End: 2022-12-22

## 2022-12-22 NOTE — TELEPHONE ENCOUNTER
Patient is calling in stating that she had an upcoming appointment with  pain management. Due to the weather they cancelled her appointment for tomorrow and rescheduled her to 12/27. The patient is having extreme pain in her left hip and was going to [facility] to get an injection. They told her to reach out to our office to see if we can prescribe her what was given to her in the hospital. The patient states she was given Naproxen 250 mg and Methocarbamol 500 mg. She states she saw Kerry Carcamo on 10/03/2022 for this same reasoning. Please let the patient know if this can be taken care of

## 2022-12-23 ENCOUNTER — TELEPHONE (OUTPATIENT)
Dept: FAMILY MEDICINE CLINIC | Age: 67
End: 2022-12-23

## 2022-12-23 DIAGNOSIS — M25.559 HIP PAIN: Primary | ICD-10-CM

## 2022-12-23 DIAGNOSIS — M62.838 MUSCLE SPASM: ICD-10-CM

## 2022-12-23 DIAGNOSIS — I10 ESSENTIAL HYPERTENSION: ICD-10-CM

## 2022-12-23 RX ORDER — METHOCARBAMOL 500 MG/1
500 TABLET, FILM COATED ORAL 2 TIMES DAILY PRN
Qty: 10 TABLET | Refills: 0 | Status: SHIPPED | OUTPATIENT
Start: 2022-12-23 | End: 2022-12-28

## 2022-12-23 RX ORDER — NAPROXEN 250 MG/1
250 TABLET ORAL 2 TIMES DAILY PRN
Qty: 10 TABLET | Refills: 0 | Status: SHIPPED | OUTPATIENT
Start: 2022-12-23 | End: 2022-12-28

## 2022-12-23 RX ORDER — LOSARTAN POTASSIUM 100 MG/1
TABLET ORAL
Qty: 90 TABLET | Refills: 0 | Status: SHIPPED | OUTPATIENT
Start: 2022-12-23

## 2022-12-23 RX ORDER — METOPROLOL TARTRATE 50 MG/1
TABLET, FILM COATED ORAL
Qty: 90 TABLET | Refills: 0 | Status: SHIPPED | OUTPATIENT
Start: 2022-12-23

## 2022-12-23 NOTE — TELEPHONE ENCOUNTER
Pt called on call provider to discuss her pain management appt being rescheduled ( due to weather) for hip pain. Pt had been prescribed steroids and naproxen in past for pain. Pt was prescribed robaxin 500 mg QID ans naproxen 250 BID from [facility] ER. She is out of meds, will refill ROBAXIN 500 mg BID PRN and Naproxen 250 BID PRN to get her to her appt. Pt also out of BP meds needs refill.

## 2023-01-10 ENCOUNTER — TELEMEDICINE (OUTPATIENT)
Dept: FAMILY MEDICINE CLINIC | Age: 68
End: 2023-01-10
Payer: COMMERCIAL

## 2023-01-10 DIAGNOSIS — R05.1 ACUTE COUGH: Primary | ICD-10-CM

## 2023-01-10 DIAGNOSIS — R06.2 WHEEZING: ICD-10-CM

## 2023-01-10 DIAGNOSIS — J40 BRONCHITIS: ICD-10-CM

## 2023-01-10 PROCEDURE — 1036F TOBACCO NON-USER: CPT | Performed by: NURSE PRACTITIONER

## 2023-01-10 PROCEDURE — 1123F ACP DISCUSS/DSCN MKR DOCD: CPT | Performed by: NURSE PRACTITIONER

## 2023-01-10 PROCEDURE — 3017F COLORECTAL CA SCREEN DOC REV: CPT | Performed by: NURSE PRACTITIONER

## 2023-01-10 PROCEDURE — 99213 OFFICE O/P EST LOW 20 MIN: CPT | Performed by: NURSE PRACTITIONER

## 2023-01-10 PROCEDURE — G8427 DOCREV CUR MEDS BY ELIG CLIN: HCPCS | Performed by: NURSE PRACTITIONER

## 2023-01-10 PROCEDURE — 1090F PRES/ABSN URINE INCON ASSESS: CPT | Performed by: NURSE PRACTITIONER

## 2023-01-10 PROCEDURE — G8400 PT W/DXA NO RESULTS DOC: HCPCS | Performed by: NURSE PRACTITIONER

## 2023-01-10 PROCEDURE — G8417 CALC BMI ABV UP PARAM F/U: HCPCS | Performed by: NURSE PRACTITIONER

## 2023-01-10 PROCEDURE — G8484 FLU IMMUNIZE NO ADMIN: HCPCS | Performed by: NURSE PRACTITIONER

## 2023-01-10 RX ORDER — BENZONATATE 100 MG/1
100 CAPSULE ORAL 3 TIMES DAILY PRN
Qty: 42 CAPSULE | Refills: 0 | Status: SHIPPED | OUTPATIENT
Start: 2023-01-10 | End: 2023-01-24

## 2023-01-10 RX ORDER — ALBUTEROL SULFATE 90 UG/1
2 AEROSOL, METERED RESPIRATORY (INHALATION) EVERY 6 HOURS PRN
Qty: 18 G | Refills: 0 | Status: SHIPPED | OUTPATIENT
Start: 2023-01-10 | End: 2023-02-09

## 2023-01-10 SDOH — ECONOMIC STABILITY: FOOD INSECURITY: WITHIN THE PAST 12 MONTHS, THE FOOD YOU BOUGHT JUST DIDN'T LAST AND YOU DIDN'T HAVE MONEY TO GET MORE.: NEVER TRUE

## 2023-01-10 SDOH — ECONOMIC STABILITY: FOOD INSECURITY: WITHIN THE PAST 12 MONTHS, YOU WORRIED THAT YOUR FOOD WOULD RUN OUT BEFORE YOU GOT MONEY TO BUY MORE.: NEVER TRUE

## 2023-01-10 ASSESSMENT — ENCOUNTER SYMPTOMS
NAUSEA: 0
STRIDOR: 0
ABDOMINAL PAIN: 0
COUGH: 1
RHINORRHEA: 0
SHORTNESS OF BREATH: 0
SORE THROAT: 0
SINUS PRESSURE: 0
COLOR CHANGE: 0
WHEEZING: 1
VOICE CHANGE: 0
DIARRHEA: 0
VOMITING: 0
BACK PAIN: 0
TROUBLE SWALLOWING: 0
SINUS PAIN: 0
PHOTOPHOBIA: 0
BLOOD IN STOOL: 0
CHOKING: 0
EYE DISCHARGE: 0
EYE ITCHING: 0
EYE REDNESS: 0
CONSTIPATION: 0
EYE PAIN: 0
CHEST TIGHTNESS: 0

## 2023-01-10 ASSESSMENT — PATIENT HEALTH QUESTIONNAIRE - PHQ9
SUM OF ALL RESPONSES TO PHQ QUESTIONS 1-9: 0
SUM OF ALL RESPONSES TO PHQ QUESTIONS 1-9: 0
SUM OF ALL RESPONSES TO PHQ9 QUESTIONS 1 & 2: 0
2. FEELING DOWN, DEPRESSED OR HOPELESS: 0
SUM OF ALL RESPONSES TO PHQ QUESTIONS 1-9: 0
SUM OF ALL RESPONSES TO PHQ QUESTIONS 1-9: 0
1. LITTLE INTEREST OR PLEASURE IN DOING THINGS: 0

## 2023-01-10 ASSESSMENT — SOCIAL DETERMINANTS OF HEALTH (SDOH): HOW HARD IS IT FOR YOU TO PAY FOR THE VERY BASICS LIKE FOOD, HOUSING, MEDICAL CARE, AND HEATING?: NOT HARD AT ALL

## 2023-01-10 NOTE — PROGRESS NOTES
1/10/2023    TELEHEALTH EVALUATION -- Audio/Visual (During [id number] public health emergency)    HPI:    Mar Hernández (:  1955) has requested an audio/video evaluation for the following concern(s):      HPI    Cough: Started feeling bad Friday night, Saturday deep harsh cough, has some sputum, clear. She denies fever, vomiting and diarrhea, nose clear. Sore ribs bilaterally and ears sore. Ear pressure. She left work early yesterday, tried Mucinex DM and slept some. Not sleeping well. She can hear wheezing. She has has bronchitis in past and pneumonia. Has had steroids in December and October for other issues, will hold off on steroids at this time. Review of Systems   Constitutional:  Positive for activity change. Negative for appetite change, chills, diaphoresis, fatigue, fever and unexpected weight change. HENT:  Negative for congestion, ear discharge, ear pain, hearing loss, nosebleeds, postnasal drip, rhinorrhea, sinus pressure, sinus pain, sneezing, sore throat, tinnitus, trouble swallowing and voice change. Eyes:  Negative for photophobia, pain, discharge, redness and itching. Respiratory:  Positive for cough and wheezing. Negative for choking, chest tightness, shortness of breath and stridor. Cardiovascular:  Negative for chest pain, palpitations and leg swelling. Gastrointestinal:  Negative for abdominal pain, blood in stool, constipation, diarrhea, nausea and vomiting. Endocrine: Negative for cold intolerance, heat intolerance, polydipsia and polyuria. Genitourinary:  Negative for difficulty urinating, dysuria, enuresis, flank pain, frequency, hematuria and urgency. Musculoskeletal:  Negative for back pain, gait problem, joint swelling, neck pain and neck stiffness. Rib pain     Skin:  Negative for color change, pallor, rash and wound. Allergic/Immunologic: Negative for environmental allergies and food allergies.    Neurological:  Negative for dizziness, tremors, syncope, speech difficulty, weakness, light-headedness, numbness and headaches. Hematological:  Negative for adenopathy. Does not bruise/bleed easily. Psychiatric/Behavioral:  Negative for agitation, behavioral problems, confusion, decreased concentration, dysphoric mood, hallucinations, self-injury, sleep disturbance and suicidal ideas. The patient is not nervous/anxious and is not hyperactive. Prior to Visit Medications    Medication Sig Taking? Authorizing Provider   albuterol sulfate HFA (PROAIR HFA) 108 (90 Base) MCG/ACT inhaler Inhale 2 puffs into the lungs every 6 hours as needed for Wheezing Yes TERRENCE Noel CNP   benzonatate (TESSALON) 100 MG capsule Take 1 capsule by mouth 3 times daily as needed for Cough Yes TERRENCE Noel CNP   losartan (COZAAR) 100 MG tablet TAKE ONE TABLET BY MOUTH DAILY Yes TERRENCE Noel CNP   metoprolol tartrate (LOPRESSOR) 50 MG tablet TAKE ONE TABLET BY MOUTH DAILY Yes TERRENCE Noel CNP   omeprazole (PRILOSEC) 20 MG delayed release capsule TAKE ONE TO TWO CAPSULES BY MOUTH EVERY DAY AS NEEDED Yes TERRENCE Granado CNP   naproxen (NAPROSYN) 250 MG tablet Take 1 tablet by mouth 2 times daily as needed for Pain (hip pain)  TERRENCE Noel CNP       Social History     Tobacco Use    Smoking status: Never    Smokeless tobacco: Never   Substance Use Topics    Alcohol use: Yes     Comment: socially; 1-2 times yearly    Drug use:  No            PHYSICAL EXAMINATION:  [ INSTRUCTIONS:  \"[x]\" Indicates a positive item  \"[]\" Indicates a negative item  -- DELETE ALL ITEMS NOT EXAMINED]  Vital Signs: (As obtained by patient/caregiver or practitioner observation)      Constitutional: [x] Appears well-developed and well-nourished [x] No apparent distress      [] Abnormal-   Mental status  [x] Alert and awake  [x] Oriented to person/place/time []Able to follow commands      Eyes:  EOM    [x]  Normal  [] Abnormal-  Sclera  [x]  Normal  [] Abnormal -         Discharge []  None visible  [] Abnormal -    HENT:   [x] Normocephalic, atraumatic. [] Abnormal   [x] Mouth/Throat: Mucous membranes are moist.     External Ears [] Normal  [] Abnormal-     Neck: [x] No visualized mass     Pulmonary/Chest: [x] Respiratory effort normal.  [x] No visualized signs of difficulty breathing or respiratory distress        [] Abnormal-      Musculoskeletal:   [x] Normal gait with no signs of ataxia         [x] Normal range of motion of neck        [] Abnormal-       Neurological:        [] No Facial Asymmetry (Cranial nerve 7 motor function) (limited exam to video visit)          [x] No gaze palsy        [] Abnormal-         Skin:        [] No significant exanthematous lesions or discoloration noted on facial skin         [] Abnormal-            Psychiatric:       [x] Normal Affect [x] No Hallucinations        [] Abnormal-     Other pertinent observable physical exam findings-       Overall patient looks and sounds good. Able speak in full sentences. Not short of breath. Patient does describe having some audible wheezes that her  noticed. Coughing keeping her up at night. Nonproductive but having postnasal drip. Due to this being a TeleHealth encounter, evaluation of the following organ systems is limited: Vitals/Constitutional/EENT/Resp/CV/GI//MS/Neuro/Skin/Heme-Lymph-Imm. ASSESSMENT/PLAN:  1. Acute cough    - albuterol sulfate HFA (PROAIR HFA) 108 (90 Base) MCG/ACT inhaler; Inhale 2 puffs into the lungs every 6 hours as needed for Wheezing  Dispense: 18 g; Refill: 0  - benzonatate (TESSALON) 100 MG capsule; Take 1 capsule by mouth 3 times daily as needed for Cough  Dispense: 42 capsule; Refill: 0    2. Bronchitis    - albuterol sulfate HFA (PROAIR HFA) 108 (90 Base) MCG/ACT inhaler; Inhale 2 puffs into the lungs every 6 hours as needed for Wheezing  Dispense: 18 g; Refill: 0  - benzonatate (TESSALON) 100 MG capsule;  Take 1 capsule by mouth 3 times daily as needed for Cough  Dispense: 42 capsule; Refill: 0    3. Wheezing    - albuterol sulfate HFA (PROAIR HFA) 108 (90 Base) MCG/ACT inhaler; Inhale 2 puffs into the lungs every 6 hours as needed for Wheezing  Dispense: 18 g; Refill: 0  - benzonatate (TESSALON) 100 MG capsule; Take 1 capsule by mouth 3 times daily as needed for Cough  Dispense: 42 capsule; Refill: 0    Educated on signs and symptoms of her cough getting worse and turning to pneumonia. Patient will follow-up if this occurs. Advised will need a chest x-ray at that time. Patient will follow up in 48 to 72 hours if not improving on current medical regimen. Then will consider second inhaler and possible steroids. Patient's been on steroids recently due to hip pain so holding off at this time. No follow-ups on file. An  electronic signature was used to authenticate this note. --TERRENCE Villatoro - CNP on 1/10/2023 at 1:54 PM          This document was prepared by a combination of typing and transcription through a voice recognition software. Brie Back, was evaluated through a synchronous (real-time) audio-video encounter. The patient (or guardian if applicable) is aware that this is a billable service, which includes applicable co-pays. This Virtual Visit was conducted with patient's (and/or legal guardian's) consent. The visit was conducted pursuant to the emergency declaration under the [address] [place] [address], [address] [place] [address] authority and the [employer] and [employer] General Act. Patient identification was verified, and a caregiver was present when appropriate. The patient was located in a state where the provider was licensed to provide care. Services were provided through a video synchronous discussion virtually to substitute for in-person clinic visit.

## 2023-01-16 DIAGNOSIS — I10 ESSENTIAL HYPERTENSION: ICD-10-CM

## 2023-01-16 RX ORDER — METOPROLOL TARTRATE 50 MG/1
TABLET, FILM COATED ORAL
Qty: 90 TABLET | Refills: 0 | Status: CANCELLED | OUTPATIENT
Start: 2023-01-16

## 2023-01-16 NOTE — TELEPHONE ENCOUNTER
To early to refill    Medication:   Requested Prescriptions     Pending Prescriptions Disp Refills    metoprolol tartrate (LOPRESSOR) 50 MG tablet 90 tablet 0     Sig: TAKE ONE TABLET BY MOUTH DAILY        Last Filled:  12/23/22 90 day supply    Patient Phone Number: [phone] (home)     Last appt: 1/10/2023   Next appt: 2/6/2023    Last OARRS: No flowsheet data found.

## 2023-02-06 ENCOUNTER — OFFICE VISIT (OUTPATIENT)
Dept: FAMILY MEDICINE CLINIC | Age: 68
End: 2023-02-06
Payer: COMMERCIAL

## 2023-02-06 VITALS
BODY MASS INDEX: 32.11 KG/M2 | DIASTOLIC BLOOD PRESSURE: 94 MMHG | SYSTOLIC BLOOD PRESSURE: 138 MMHG | HEART RATE: 75 BPM | HEIGHT: 67 IN | WEIGHT: 204.6 LBS

## 2023-02-06 DIAGNOSIS — J40 BRONCHITIS: ICD-10-CM

## 2023-02-06 DIAGNOSIS — M79.7 FIBROMYALGIA: ICD-10-CM

## 2023-02-06 DIAGNOSIS — Z00.00 WELL ADULT EXAM: Primary | ICD-10-CM

## 2023-02-06 DIAGNOSIS — K21.9 GASTROESOPHAGEAL REFLUX DISEASE WITHOUT ESOPHAGITIS: ICD-10-CM

## 2023-02-06 DIAGNOSIS — Z78.0 POSTMENOPAUSAL: ICD-10-CM

## 2023-02-06 DIAGNOSIS — I10 ESSENTIAL HYPERTENSION: ICD-10-CM

## 2023-02-06 DIAGNOSIS — Z12.11 SCREENING FOR COLON CANCER: ICD-10-CM

## 2023-02-06 PROCEDURE — G8484 FLU IMMUNIZE NO ADMIN: HCPCS | Performed by: NURSE PRACTITIONER

## 2023-02-06 PROCEDURE — 90677 PCV20 VACCINE IM: CPT | Performed by: NURSE PRACTITIONER

## 2023-02-06 PROCEDURE — 3080F DIAST BP >= 90 MM HG: CPT | Performed by: NURSE PRACTITIONER

## 2023-02-06 PROCEDURE — 99397 PER PM REEVAL EST PAT 65+ YR: CPT | Performed by: NURSE PRACTITIONER

## 2023-02-06 PROCEDURE — 90694 VACC AIIV4 NO PRSRV 0.5ML IM: CPT | Performed by: NURSE PRACTITIONER

## 2023-02-06 PROCEDURE — 90472 IMMUNIZATION ADMIN EACH ADD: CPT | Performed by: NURSE PRACTITIONER

## 2023-02-06 PROCEDURE — 3075F SYST BP GE 130 - 139MM HG: CPT | Performed by: NURSE PRACTITIONER

## 2023-02-06 PROCEDURE — 90471 IMMUNIZATION ADMIN: CPT | Performed by: NURSE PRACTITIONER

## 2023-02-06 RX ORDER — OMEPRAZOLE 20 MG/1
CAPSULE, DELAYED RELEASE ORAL
Qty: 60 CAPSULE | Refills: 5 | Status: SHIPPED | OUTPATIENT
Start: 2023-02-06

## 2023-02-06 RX ORDER — SUMATRIPTAN 100 MG/1
100 TABLET, FILM COATED ORAL
Qty: 9 TABLET | Refills: 3 | Status: SHIPPED | OUTPATIENT
Start: 2023-02-06 | End: 2023-02-06

## 2023-02-06 RX ORDER — FLUTICASONE PROPIONATE 50 MCG
1-2 SPRAY, SUSPENSION (ML) NASAL DAILY
Qty: 1 EACH | Refills: 1 | Status: SHIPPED | OUTPATIENT
Start: 2023-02-06

## 2023-02-06 RX ORDER — CEFDINIR 300 MG/1
300 CAPSULE ORAL 2 TIMES DAILY
Qty: 14 CAPSULE | Refills: 0 | Status: SHIPPED | OUTPATIENT
Start: 2023-02-06 | End: 2023-02-13

## 2023-02-06 SDOH — ECONOMIC STABILITY: FOOD INSECURITY: WITHIN THE PAST 12 MONTHS, YOU WORRIED THAT YOUR FOOD WOULD RUN OUT BEFORE YOU GOT MONEY TO BUY MORE.: NEVER TRUE

## 2023-02-06 SDOH — ECONOMIC STABILITY: INCOME INSECURITY: HOW HARD IS IT FOR YOU TO PAY FOR THE VERY BASICS LIKE FOOD, HOUSING, MEDICAL CARE, AND HEATING?: NOT HARD AT ALL

## 2023-02-06 SDOH — ECONOMIC STABILITY: HOUSING INSECURITY
IN THE LAST 12 MONTHS, WAS THERE A TIME WHEN YOU DID NOT HAVE A STEADY PLACE TO SLEEP OR SLEPT IN A SHELTER (INCLUDING NOW)?: NO

## 2023-02-06 SDOH — ECONOMIC STABILITY: FOOD INSECURITY: WITHIN THE PAST 12 MONTHS, THE FOOD YOU BOUGHT JUST DIDN'T LAST AND YOU DIDN'T HAVE MONEY TO GET MORE.: NEVER TRUE

## 2023-02-06 NOTE — PROGRESS NOTES
[facility] PRIMARY CARE  [facility] [id number]  Phone: [phone]    2023    Yennifer Rico (:  1955) is a 74 y.o. female, here for a preventive medicine evaluation. Patient Active Problem List   Diagnosis    Migraine    Insomnia    Fibromyalgia    HTN (hypertension)    GERD (gastroesophageal reflux disease)    Hiatal hernia    HLD (hyperlipidemia)     HYPERTENSION  Did not take BP medication before visit  Taking metoprolol and losartan     GERD  Takes omeprazole BID- is working well  Continues to eat at [employer] and [address] [place] [address]  Right now has been ok  Hips, hands, arms  Hx of injection in SI joint- helped    Cough- bronchitis  Lingering  Nonproductive, sometimes is productive  Clears throat  Continues to feel tired  No runny nose or congestion  No fevers or chills  No vomiting or diarrhea  Hx of pneumonia and bronchitis  Stopped using albuterol    Review of Systems    Prior to Visit Medications    Medication Sig Taking?  Authorizing Provider   omeprazole (PRILOSEC) 20 MG delayed release capsule TAKE ONE TO TWO CAPSULES BY MOUTH EVERY DAY AS NEEDED Yes TERRENCE Cunningham CNP   fluticasone (FLONASE) 50 MCG/ACT nasal spray 1-2 sprays by Each Nostril route daily Yes TERRENCE Qiuck CNP   cefdinir (OMNICEF) 300 MG capsule Take 1 capsule by mouth 2 times daily for 7 days Yes TERRENCE Quick CNP   SUMAtriptan (IMITREX) 100 MG tablet Take 1 tablet by mouth once as needed for Migraine Yes TERRENCE Quick CNP   losartan (COZAAR) 100 MG tablet TAKE ONE TABLET BY MOUTH DAILY Yes Beryle Nanny, APRN - CNP   metoprolol tartrate (LOPRESSOR) 50 MG tablet TAKE ONE TABLET BY MOUTH DAILY Yes Beryle Nanny, APRN - CNP        Allergies   Allergen Reactions    Clarithromycin      Breathing and itching       Past Medical History:   Diagnosis Date    Fibromyalgia     GERD (gastroesophageal reflux disease)     Hiatal hernia     Dx on EGD before age 50. HTN (hypertension)     Insomnia     Migraine        Past Surgical History:   Procedure Laterality Date    HYSTERECTOMY (CERVIX STATUS UNKNOWN)  2002 2/2 endometriosis    KNEE SURGERY Left 08/2016    SHOULDER SURGERY Left 07/2013    ALEXIS AND BSO (CERVIX REMOVED)      2002       Social History     Socioeconomic History    Marital status:      Spouse name: Not on file    Number of children: Not on file    Years of education: Not on file    Highest education level: Not on file   Occupational History    Not on file   Tobacco Use    Smoking status: Never    Smokeless tobacco: Never   Substance and Sexual Activity    Alcohol use: Yes     Comment: socially; 1-2 times yearly    Drug use: No    Sexual activity: Not Currently   Other Topics Concern    Not on file   Social History Narrative    7/18/16     to patient David Roy. Was not able to have kids. Leland Ge has 2 kids. Social Determinants of Health     Financial Resource Strain: Low Risk     Difficulty of Paying Living Expenses: Not hard at all   Food Insecurity: No Food Insecurity    Worried About [address] in the Last Year: Never true    [address] in the Last Year: Never true   Transportation Needs: Unknown    Lack of Transportation (Medical): Not on file    Lack of Transportation (Non-Medical):  No   Physical Activity: Not on file   Stress: Not on file   Social Connections: Not on file   Intimate Partner Violence: Not on file   Housing Stability: Unknown    Unable to Pay for Housing in the Last Year: Not on file    Number of Places Lived in the Last Year: Not on file    Unstable Housing in the Last Year: No        Family History   Problem Relation Age of Onset    Heart Disease Mother     Cancer Maternal Cousin         breast        ADVANCE DIRECTIVE: N, <no information>    Vitals:    02/06/23 0740   BP: (!) 138/94   Site: Left Upper Arm   Position: Sitting   Cuff Size: Medium Adult   Pulse: 75   Weight: 204 lb 9.6 oz (92.8 kg)   Height: 5' 7\" (1.702 m)   PF: 94 L/min     Estimated body mass index is 32.04 kg/m² as calculated from the following:    Height as of this encounter: 5' 7\" (1.702 m). Weight as of this encounter: 204 lb 9.6 oz (92.8 kg). Physical Exam  Vitals reviewed. Constitutional:       Appearance: Normal appearance. HENT:      Head: Normocephalic. Right Ear: Tympanic membrane, ear canal and external ear normal.      Left Ear: Tympanic membrane, ear canal and external ear normal.      Nose: Nose normal.      Mouth/Throat:      Lips: Pink. Mouth: Mucous membranes are moist.      Pharynx: Oropharynx is clear. Uvula midline. Cardiovascular:      Rate and Rhythm: Normal rate and regular rhythm. Pulses: Normal pulses. Heart sounds: Normal heart sounds, S1 normal and S2 normal.   Pulmonary:      Effort: Pulmonary effort is normal.      Breath sounds: Normal breath sounds and air entry. Abdominal:      Palpations: Abdomen is soft. Tenderness: There is no abdominal tenderness. Musculoskeletal:      Right lower leg: No edema. Left lower leg: No edema. Neurological:      Mental Status: She is alert. Psychiatric:         Mood and Affect: Mood normal.       No flowsheet data found.     Lab Results   Component Value Date/Time    CHOL 242 02/01/2022 08:38 AM    CHOL 252 02/01/2021 08:47 AM    CHOL 229 01/27/2020 08:54 AM    TRIG 149 02/01/2022 08:38 AM    TRIG 249 02/01/2021 08:47 AM    TRIG 134 01/27/2020 08:54 AM    HDL 70 02/01/2022 08:38 AM    HDL 63 02/01/2021 08:47 AM    HDL 70 01/27/2020 08:54 AM    LDLCALC 142 02/01/2022 08:38 AM    LDLCALC 139 02/01/2021 08:47 AM    LDLCALC 132 01/27/2020 08:54 AM    GLUCOSE 100 02/01/2022 08:38 AM    LABA1C 5.7 02/01/2022 08:38 AM    LABA1C 5.8 02/01/2021 08:47 AM    LABA1C 5.7 01/27/2020 08:54 AM       The 10-year ASCVD risk score (Percy LAST, et al., 2019) is: 8.6%    Values used to calculate the score:      Age: 74 years      Sex: Female      Is Non- : No      Diabetic: No      Tobacco smoker: No      Systolic Blood Pressure: 981 mmHg      Is BP treated: Yes      HDL Cholesterol: 70 mg/dL      Total Cholesterol: 242 mg/dL    Immunization History   Administered Date(s) Administered    COVID-19, J&J, (age 18y+), IM, 0.5 mL 04/01/2021, 09/07/2021, 12/02/2021    Influenza, FLUAD, (age 72 y+), Adjuvanted, 0.5mL 02/06/2023    Pneumococcal conjugate PCV20, PF (Prevnar 20) 02/06/2023    Tdap (Boostrix, Adacel) 07/18/2016    Zoster Live (Zostavax) 11/06/2017    Zoster Recombinant (Shingrix) 01/27/2020, 02/01/2021       Health Maintenance   Topic Date Due    DEXA (modify frequency per FRAX score)  08/19/2012    Colorectal Cancer Screen  03/01/2020    COVID-19 Vaccine (4 - Booster for Mili series) 01/27/2022    A1C test (Diabetic or Prediabetic)  02/01/2023    Depression Screen  01/10/2024    Breast cancer screen  02/17/2024    DTaP/Tdap/Td vaccine (2 - Td or Tdap) 07/18/2026    Lipids  02/01/2027    Flu vaccine  Completed    Shingles vaccine  Completed    Pneumococcal 65+ years Vaccine  Completed    Hepatitis C screen  Completed    HIV screen  Completed    Hepatitis A vaccine  Aged Out    Hib vaccine  Aged Out    Meningococcal (ACWY) vaccine  Aged Out       ASSESSMENT/PLAN:  1. Well adult exam  Stable;  75 yo wellness exam.  Immunization counseling for flu and [id number]. Labs ordered. - Lipid Panel; Future  - Comprehensive Metabolic Panel; Future  - Hemoglobin A1C; Future  - TSH; Future  - CBC; Future  - Influenza, FLUCELVAX, (age 10 mo+), IM, Preservative Free, 0.5 mL  - Pneumococcal, PCV20, PREVNAR 20, (age 25 yrs+), IM, PF    2. Essential hypertension  Stable; Not progressing;  Elevated in the office, however patient has not taken her medications yet today. Will recheck after patient has taken at a nurse visit.     3. Gastroesophageal reflux disease without esophagitis  Stable;  Patient doing well on omeprazole. Continue current regimen. - omeprazole (PRILOSEC) 20 MG delayed release capsule; TAKE ONE TO TWO CAPSULES BY MOUTH EVERY DAY AS NEEDED  Dispense: 60 capsule; Refill: 5    4. Fibromyalgia  Stable;  Patient doing well. Will monitor. 5. Postmenopausal  Stable;  DEXA scan ordered. - DEXA BONE DENSITY AXIAL SKELETON; Future    6. Bronchitis  Stable; Not progressing;  Begin cefdinir. - cefdinir (OMNICEF) 300 MG capsule; Take 1 capsule by mouth 2 times daily for 7 days  Dispense: 14 capsule; Refill: 0    7. Screening for colon cancer  Stable;  Patient agreeable to cologuard. - Fecal DNA Colorectal cancer screening (Cologuard)    FOLLOW UP:       Return in about 1 year (around 2/6/2024), or if symptoms worsen or fail to improve.

## 2023-02-07 ENCOUNTER — TELEPHONE (OUTPATIENT)
Dept: FAMILY MEDICINE CLINIC | Age: 68
End: 2023-02-07

## 2023-02-07 NOTE — TELEPHONE ENCOUNTER
Patient informed. Patient will stop by the office on Thursday after she has her blood work drawn. Patient is aware she needs to take her medication prior.

## 2023-02-07 NOTE — TELEPHONE ENCOUNTER
----- Message from TERRENCE Villarreal CNP sent at 2/6/2023  3:26 PM EST -----  Can you see if patient will come in for a nurse BP check after she takes her medications one day?

## 2023-02-09 ENCOUNTER — NURSE ONLY (OUTPATIENT)
Dept: FAMILY MEDICINE CLINIC | Age: 68
End: 2023-02-09

## 2023-02-09 VITALS — SYSTOLIC BLOOD PRESSURE: 126 MMHG | DIASTOLIC BLOOD PRESSURE: 82 MMHG

## 2023-02-17 ENCOUNTER — TELEPHONE (OUTPATIENT)
Dept: FAMILY MEDICINE CLINIC | Age: 68
End: 2023-02-17

## 2023-02-17 NOTE — TELEPHONE ENCOUNTER
Medication:   Requested Prescriptions      No prescriptions requested or ordered in this encounter        Last Filled:      Patient Phone Number: [phone] (home)     Last appt: 2/6/2023   Next appt: Visit date not found    Last OARRS: No flowsheet data found.

## 2023-02-22 ENCOUNTER — TELEPHONE (OUTPATIENT)
Dept: FAMILY MEDICINE CLINIC | Age: 68
End: 2023-02-22

## 2023-02-22 NOTE — TELEPHONE ENCOUNTER
Patient is calling in stating that after her appointment with Yannick Christian she has now finished the antibiotic and has continued to use Flonase as instructed. She states it has not moved up into her sinuses and ears. She states she is having severe L ear pain and sinus drainage. She did not know if another antibiotic would help or not. Please advise the patient on what she should do next.

## 2023-02-23 ENCOUNTER — OFFICE VISIT (OUTPATIENT)
Dept: FAMILY MEDICINE CLINIC | Age: 68
End: 2023-02-23
Payer: COMMERCIAL

## 2023-02-23 VITALS
SYSTOLIC BLOOD PRESSURE: 134 MMHG | WEIGHT: 205 LBS | HEART RATE: 76 BPM | DIASTOLIC BLOOD PRESSURE: 86 MMHG | HEIGHT: 67 IN | BODY MASS INDEX: 32.18 KG/M2 | OXYGEN SATURATION: 98 %

## 2023-02-23 DIAGNOSIS — J06.9 VIRAL URI: ICD-10-CM

## 2023-02-23 DIAGNOSIS — H69.83 DYSFUNCTION OF BOTH EUSTACHIAN TUBES: Primary | ICD-10-CM

## 2023-02-23 DIAGNOSIS — Z12.11 SCREENING FOR COLON CANCER: ICD-10-CM

## 2023-02-23 PROCEDURE — 1123F ACP DISCUSS/DSCN MKR DOCD: CPT | Performed by: NURSE PRACTITIONER

## 2023-02-23 PROCEDURE — G8427 DOCREV CUR MEDS BY ELIG CLIN: HCPCS | Performed by: NURSE PRACTITIONER

## 2023-02-23 PROCEDURE — G8484 FLU IMMUNIZE NO ADMIN: HCPCS | Performed by: NURSE PRACTITIONER

## 2023-02-23 PROCEDURE — 1090F PRES/ABSN URINE INCON ASSESS: CPT | Performed by: NURSE PRACTITIONER

## 2023-02-23 PROCEDURE — 99214 OFFICE O/P EST MOD 30 MIN: CPT | Performed by: NURSE PRACTITIONER

## 2023-02-23 PROCEDURE — 1036F TOBACCO NON-USER: CPT | Performed by: NURSE PRACTITIONER

## 2023-02-23 PROCEDURE — 3079F DIAST BP 80-89 MM HG: CPT | Performed by: NURSE PRACTITIONER

## 2023-02-23 PROCEDURE — G8400 PT W/DXA NO RESULTS DOC: HCPCS | Performed by: NURSE PRACTITIONER

## 2023-02-23 PROCEDURE — G8417 CALC BMI ABV UP PARAM F/U: HCPCS | Performed by: NURSE PRACTITIONER

## 2023-02-23 PROCEDURE — 3075F SYST BP GE 130 - 139MM HG: CPT | Performed by: NURSE PRACTITIONER

## 2023-02-23 PROCEDURE — 3017F COLORECTAL CA SCREEN DOC REV: CPT | Performed by: NURSE PRACTITIONER

## 2023-02-23 RX ORDER — METHYLPREDNISOLONE 4 MG/1
TABLET ORAL
Qty: 1 KIT | Refills: 0 | Status: SHIPPED | OUTPATIENT
Start: 2023-02-23 | End: 2023-03-01

## 2023-02-23 NOTE — PROGRESS NOTES
Pao Baumann (:  1955) is a 74 y.o. female,Established patient, here for evaluation of the following chief complaint(s):  Sinusitis, Ear Fullness, and Cough       ASSESSMENT/PLAN:  1. Dysfunction of both eustachian tubes  Stable; Not progressing;  Continue flonase and begin medrol dose pack. -     methylPREDNISolone (MEDROL DOSEPACK) 4 MG tablet; Take by mouth. Follow directions on package, Disp-1 kit, R-0Normal  2. Viral URI  Stable; Not progressing;  Can try humidifier, steam, fluids, warm salt water gargles, warm tea with honey and flonase. 3. Screening for colon cancer  Stable;  Patient agreeable to FIT test and is low risk.  -     POCT Fecal Immunochemical Test (FIT); Future    Return if symptoms worsen or fail to improve. Subjective   SUBJECTIVE/OBJECTIVE:  HPI  Started feeling good after previous visit- would cough a little bit  Saturday PM/  AM  Ears have never popped back to normal  Weather going up and down has not been helpful  Congestion; nothing comes out when blows or is clear  Cough- dry  Is nauseous; decreased appetite  No fevers or chills  No sore throat  Flonase- no improvement    Would prefer a FIT card    Review of Systems       Objective   Physical Exam  Vitals reviewed. Constitutional:       Appearance: Normal appearance. HENT:      Head: Normocephalic. Right Ear: Ear canal and external ear normal. A middle ear effusion is present. Left Ear: Ear canal and external ear normal. A middle ear effusion is present. Nose: Congestion present. Mouth/Throat:      Lips: Pink. Mouth: Mucous membranes are moist.      Pharynx: Oropharynx is clear. Uvula midline. Cardiovascular:      Rate and Rhythm: Normal rate and regular rhythm. Pulses: Normal pulses. Heart sounds: Normal heart sounds, S1 normal and S2 normal.   Pulmonary:      Effort: Pulmonary effort is normal.      Breath sounds: Normal breath sounds and air entry.    Musculoskeletal: Right lower leg: No edema. Left lower leg: No edema. Neurological:      Mental Status: She is alert. Psychiatric:         Mood and Affect: Mood normal.       An electronic signature was used to authenticate this note.     --Martina Lopez, TERRENCE - CNP

## 2023-02-23 NOTE — TELEPHONE ENCOUNTER
Pt called to check on the status of her request.  Pt sts would really like to speak with Adeel Santos or MA today. Please advise.

## 2023-03-19 DIAGNOSIS — I10 ESSENTIAL HYPERTENSION: ICD-10-CM

## 2023-03-20 RX ORDER — LOSARTAN POTASSIUM 100 MG/1
TABLET ORAL
Qty: 90 TABLET | Refills: 0 | Status: SHIPPED | OUTPATIENT
Start: 2023-03-20

## 2023-03-20 RX ORDER — METOPROLOL TARTRATE 50 MG/1
TABLET, FILM COATED ORAL
Qty: 90 TABLET | Refills: 0 | Status: SHIPPED | OUTPATIENT
Start: 2023-03-20

## 2023-03-20 NOTE — TELEPHONE ENCOUNTER
Medication:   Requested Prescriptions     Pending Prescriptions Disp Refills    losartan (COZAAR) 100 MG tablet [Pharmacy Med Name: LOSARTAN POTASSIUM 100 MG TAB] 90 tablet 0     Sig: TAKE ONE TABLET BY MOUTH DAILY    metoprolol tartrate (LOPRESSOR) 50 MG tablet [Pharmacy Med Name: METOPROLOL TARTRATE 50 MG TAB] 90 tablet 0     Sig: TAKE ONE TABLET BY MOUTH DAILY        Last Filled:  12/23/22    Patient Phone Number: [phone] (home)     Last appt: 2/23/2023   Next appt: Visit date not found    Last OARRS: No flowsheet data found.

## 2023-05-08 ENCOUNTER — TELEPHONE (OUTPATIENT)
Dept: PRIMARY CARE CLINIC | Age: 68
End: 2023-05-08

## 2023-06-21 DIAGNOSIS — I10 ESSENTIAL HYPERTENSION: ICD-10-CM

## 2023-06-21 RX ORDER — LOSARTAN POTASSIUM 100 MG/1
100 TABLET ORAL DAILY
Qty: 90 TABLET | Refills: 0 | Status: SHIPPED | OUTPATIENT
Start: 2023-06-21

## 2023-06-21 NOTE — TELEPHONE ENCOUNTER
Medication:   Requested Prescriptions     Pending Prescriptions Disp Refills    losartan (COZAAR) 100 MG tablet 90 tablet 0     Sig: Take 1 tablet by mouth daily        Last Filled:  3/20/23    Patient Phone Number: [phone] (home)     Last appt: 2/23/2023   Next appt: Visit date not found    Last OARRS: No flowsheet data found.

## 2023-08-09 DIAGNOSIS — I10 ESSENTIAL HYPERTENSION: ICD-10-CM

## 2023-08-09 RX ORDER — METOPROLOL TARTRATE 50 MG/1
50 TABLET, FILM COATED ORAL DAILY
Qty: 90 TABLET | Refills: 0 | Status: SHIPPED | OUTPATIENT
Start: 2023-08-09

## 2023-08-09 NOTE — TELEPHONE ENCOUNTER
Last Fill 3/20/23  Last Office Visit 2/23/23  Pending Appointments  None Message  Return to work or school:   Jacklyn Byrd is under my professional care  She was seen in my office on 11/9/2017  Signatures   Electronically signed by :  Zaheer Vail, ; Nov 9 2017  1:51PM EST                       (Author)

## 2023-10-22 DIAGNOSIS — I10 ESSENTIAL HYPERTENSION: ICD-10-CM

## 2023-10-23 RX ORDER — LOSARTAN POTASSIUM 100 MG/1
100 TABLET ORAL DAILY
Qty: 30 TABLET | Refills: 2 | Status: SHIPPED | OUTPATIENT
Start: 2023-10-23

## 2023-10-23 NOTE — TELEPHONE ENCOUNTER
Medication:   Requested Prescriptions     Pending Prescriptions Disp Refills    losartan (COZAAR) 100 MG tablet [Pharmacy Med Name: LOSARTAN POTASSIUM 100 MG TAB] 30 tablet      Sig: TAKE 1 TABLET BY MOUTH DAILY        Last Filled:  06/21/2023    Patient Phone Number: [phone] (home)     Last appt: 2/23/2023   Next appt: Visit date not found    Last OARRS:        No data to display

## 2023-12-11 DIAGNOSIS — I10 ESSENTIAL HYPERTENSION: ICD-10-CM

## 2023-12-11 RX ORDER — METOPROLOL TARTRATE 50 MG/1
50 TABLET, FILM COATED ORAL DAILY
Qty: 30 TABLET | Refills: 1 | Status: SHIPPED | OUTPATIENT
Start: 2023-12-11

## 2023-12-11 NOTE — TELEPHONE ENCOUNTER
Medication:   Requested Prescriptions     Pending Prescriptions Disp Refills    metoprolol tartrate (LOPRESSOR) 50 MG tablet [Pharmacy Med Name: METOPROLOL TARTRATE 50 MG TAB] 30 tablet      Sig: TAKE 1 TABLET BY MOUTH DAILY        Last Filled:  08/09/2023    Patient Phone Number: [phone] (home)     Last appt: 2/23/2023   Next appt: Visit date not found    Last OARRS:        No data to display

## 2024-01-16 DIAGNOSIS — K21.9 GASTROESOPHAGEAL REFLUX DISEASE WITHOUT ESOPHAGITIS: ICD-10-CM

## 2024-01-16 RX ORDER — OMEPRAZOLE 20 MG/1
CAPSULE, DELAYED RELEASE ORAL
Qty: 60 CAPSULE | Refills: 5 | Status: SHIPPED | OUTPATIENT
Start: 2024-01-16

## 2024-01-16 NOTE — TELEPHONE ENCOUNTER
Medication:   Requested Prescriptions     Pending Prescriptions Disp Refills    omeprazole (PRILOSEC) 20 MG delayed release capsule [Pharmacy Med Name: OMEPRAZOLE DR 20 MG CAPSULE] 60 capsule 5     Sig: TAKE ONE TO TWO CAPSULES BY MOUTH DAILY AS NEEDED        Last Filled:  2/6/23    Patient Phone Number: [phone] (home)     Last appt: 2/23/2023   Next appt: Visit date not found    Last OARRS:        No data to display

## 2024-01-17 ENCOUNTER — TELEPHONE (OUTPATIENT)
Dept: FAMILY MEDICINE CLINIC | Age: 69
End: 2024-01-17

## 2024-01-17 NOTE — TELEPHONE ENCOUNTER
----- Message from Dina Davidson sent at 1/17/2024 10:39 AM EST -----  Subject: Appointment Request    Reason for Call: Established Patient Appointment needed: Routine Medicare   AWV    QUESTIONS    Reason for appointment request? No appointments available during search     Additional Information for Provider? Pt needs annual appt for updated   prescriptions, no appts available.   ---------------------------------------------------------------------------  --------------  CALL BACK INFO  [phone]; OK to leave message on voicemail  ---------------------------------------------------------------------------  --------------  SCRIPT ANSWERS

## 2024-02-08 ENCOUNTER — OFFICE VISIT (OUTPATIENT)
Dept: FAMILY MEDICINE CLINIC | Age: 69
End: 2024-02-08
Payer: COMMERCIAL

## 2024-02-08 VITALS
WEIGHT: 213 LBS | HEART RATE: 96 BPM | OXYGEN SATURATION: 98 % | BODY MASS INDEX: 33.43 KG/M2 | SYSTOLIC BLOOD PRESSURE: 122 MMHG | HEIGHT: 67 IN | DIASTOLIC BLOOD PRESSURE: 86 MMHG

## 2024-02-08 DIAGNOSIS — Z78.0 POSTMENOPAUSAL: ICD-10-CM

## 2024-02-08 DIAGNOSIS — M79.7 FIBROMYALGIA: ICD-10-CM

## 2024-02-08 DIAGNOSIS — I10 ESSENTIAL HYPERTENSION: ICD-10-CM

## 2024-02-08 DIAGNOSIS — G43.109 MIGRAINE WITH AURA AND WITHOUT STATUS MIGRAINOSUS, NOT INTRACTABLE: ICD-10-CM

## 2024-02-08 DIAGNOSIS — K21.9 GASTROESOPHAGEAL REFLUX DISEASE WITHOUT ESOPHAGITIS: ICD-10-CM

## 2024-02-08 DIAGNOSIS — Z00.00 WELL ADULT EXAM: Primary | ICD-10-CM

## 2024-02-08 PROCEDURE — G8484 FLU IMMUNIZE NO ADMIN: HCPCS | Performed by: NURSE PRACTITIONER

## 2024-02-08 PROCEDURE — 90694 VACC AIIV4 NO PRSRV 0.5ML IM: CPT | Performed by: NURSE PRACTITIONER

## 2024-02-08 PROCEDURE — 90471 IMMUNIZATION ADMIN: CPT | Performed by: NURSE PRACTITIONER

## 2024-02-08 PROCEDURE — 3079F DIAST BP 80-89 MM HG: CPT | Performed by: NURSE PRACTITIONER

## 2024-02-08 PROCEDURE — 99397 PER PM REEVAL EST PAT 65+ YR: CPT | Performed by: NURSE PRACTITIONER

## 2024-02-08 PROCEDURE — 3074F SYST BP LT 130 MM HG: CPT | Performed by: NURSE PRACTITIONER

## 2024-02-08 RX ORDER — METOPROLOL TARTRATE 50 MG/1
50 TABLET, FILM COATED ORAL DAILY
Qty: 90 TABLET | Refills: 1 | Status: SHIPPED | OUTPATIENT
Start: 2024-02-08

## 2024-02-08 RX ORDER — LOSARTAN POTASSIUM 100 MG/1
100 TABLET ORAL DAILY
Qty: 90 TABLET | Refills: 1 | Status: SHIPPED | OUTPATIENT
Start: 2024-02-08

## 2024-02-08 SDOH — ECONOMIC STABILITY: INCOME INSECURITY: HOW HARD IS IT FOR YOU TO PAY FOR THE VERY BASICS LIKE FOOD, HOUSING, MEDICAL CARE, AND HEATING?: NOT HARD AT ALL

## 2024-02-08 SDOH — ECONOMIC STABILITY: FOOD INSECURITY: WITHIN THE PAST 12 MONTHS, THE FOOD YOU BOUGHT JUST DIDN'T LAST AND YOU DIDN'T HAVE MONEY TO GET MORE.: NEVER TRUE

## 2024-02-08 SDOH — ECONOMIC STABILITY: FOOD INSECURITY: WITHIN THE PAST 12 MONTHS, YOU WORRIED THAT YOUR FOOD WOULD RUN OUT BEFORE YOU GOT MONEY TO BUY MORE.: NEVER TRUE

## 2024-02-08 ASSESSMENT — PATIENT HEALTH QUESTIONNAIRE - PHQ9
1. LITTLE INTEREST OR PLEASURE IN DOING THINGS: 0
SUM OF ALL RESPONSES TO PHQ QUESTIONS 1-9: 0
SUM OF ALL RESPONSES TO PHQ9 QUESTIONS 1 & 2: 0
SUM OF ALL RESPONSES TO PHQ QUESTIONS 1-9: 0
2. FEELING DOWN, DEPRESSED OR HOPELESS: 0

## 2024-02-08 NOTE — PROGRESS NOTES
[facility]  [facility]  [address]  [place] [address]  Phone: [phone]    2024    Nereida Schafer (:  1955) is a 68 y.o. female, here for a preventive medicine evaluation.    Patient Active Problem List   Diagnosis    Migraine    Insomnia    Fibromyalgia    HTN (hypertension)    GERD (gastroesophageal reflux disease)    Hiatal hernia    HLD (hyperlipidemia)     HYPERTENSION  BP at visit  Thinks it has been running good- doesn't check it  Taking metoprolol and losartan     Fibromyalgia  Hips have been flaring  Left knee has been hurting- hx of surgery, septic arthritis  Gets massages  Silver sneakers at [facility]- wants to do stationary bike, water aerobics  By September- accomplished what she wants to accomplish- part time    GERD  Takes omeprazole BID- is working well  Continues to eat at [employer] and [employer]    Migraines  Has had headaches, but not needed Imitrex  Massage helps    Will retire in 3 weeks;     Review of Systems    Prior to Visit Medications    Medication Sig Taking? Authorizing Provider   metoprolol tartrate (LOPRESSOR) 50 MG tablet Take 1 tablet by mouth daily Yes Liat Richardson APRN - CNP   losartan (COZAAR) 100 MG tablet Take 1 tablet by mouth daily Yes Liat Richardson APRN - CNP   omeprazole (PRILOSEC) 20 MG delayed release capsule TAKE ONE TO TWO CAPSULES BY MOUTH DAILY AS NEEDED Yes Liat Richardson APRN - CNP   SUMAtriptan (IMITREX) 100 MG tablet Take 1 tablet by mouth once as needed for Migraine Yes Liat Richardson APRN - CNP        Allergies   Allergen Reactions    Clarithromycin      Breathing and itching       Past Medical History:   Diagnosis Date    Fibromyalgia     GERD (gastroesophageal reflux disease)     Hiatal hernia     Dx on EGD before age 52.     HTN (hypertension)     Insomnia     Migraine        Past Surgical History:   Procedure Laterality Date    HYSTERECTOMY (CERVIX STATUS

## 2024-02-21 DIAGNOSIS — Z00.00 WELL ADULT EXAM: ICD-10-CM

## 2024-02-21 DIAGNOSIS — M79.7 FIBROMYALGIA: ICD-10-CM

## 2024-02-21 LAB
ALBUMIN SERPL-MCNC: 4.5 G/DL (ref 3.4–5)
ALBUMIN/GLOB SERPL: 1.7 {RATIO} (ref 1.1–2.2)
ALP SERPL-CCNC: 90 U/L (ref 40–129)
ALT SERPL-CCNC: 14 U/L (ref 10–40)
ANION GAP SERPL CALCULATED.3IONS-SCNC: 11 MMOL/L (ref 3–16)
AST SERPL-CCNC: 15 U/L (ref 15–37)
BILIRUB SERPL-MCNC: 0.4 MG/DL (ref 0–1)
BUN SERPL-MCNC: 10 MG/DL (ref 7–20)
CALCIUM SERPL-MCNC: 10.2 MG/DL (ref 8.3–10.6)
CHLORIDE SERPL-SCNC: 104 MMOL/L (ref 99–110)
CHOLEST SERPL-MCNC: 213 MG/DL (ref 0–199)
CO2 SERPL-SCNC: 25 MMOL/L (ref 21–32)
CREAT SERPL-MCNC: 1 MG/DL (ref 0.6–1.2)
DEPRECATED RDW RBC AUTO: 13.7 % (ref 12.4–15.4)
GFR SERPLBLD CREATININE-BSD FMLA CKD-EPI: >60 ML/MIN/{1.73_M2}
GLUCOSE SERPL-MCNC: 99 MG/DL (ref 70–99)
HCT VFR BLD AUTO: 42.6 % (ref 36–48)
HDLC SERPL-MCNC: 60 MG/DL (ref 40–60)
HGB BLD-MCNC: 14.2 G/DL (ref 12–16)
LDLC SERPL CALC-MCNC: 116 MG/DL
MAGNESIUM SERPL-MCNC: 2.3 MG/DL (ref 1.8–2.4)
MCH RBC QN AUTO: 29.8 PG (ref 26–34)
MCHC RBC AUTO-ENTMCNC: 33.3 G/DL (ref 31–36)
MCV RBC AUTO: 89.5 FL (ref 80–100)
PLATELET # BLD AUTO: 235 K/UL (ref 135–450)
PMV BLD AUTO: 9.9 FL (ref 5–10.5)
POTASSIUM SERPL-SCNC: 4.5 MMOL/L (ref 3.5–5.1)
PROT SERPL-MCNC: 7.2 G/DL (ref 6.4–8.2)
RBC # BLD AUTO: 4.76 M/UL (ref 4–5.2)
SODIUM SERPL-SCNC: 140 MMOL/L (ref 136–145)
TRIGL SERPL-MCNC: 187 MG/DL (ref 0–150)
TSH SERPL DL<=0.005 MIU/L-ACNC: 1.96 UIU/ML (ref 0.27–4.2)
VIT B12 SERPL-MCNC: 512 PG/ML (ref 211–911)
VLDLC SERPL CALC-MCNC: 37 MG/DL
WBC # BLD AUTO: 4.2 K/UL (ref 4–11)

## 2024-02-22 LAB
EST. AVERAGE GLUCOSE BLD GHB EST-MCNC: 116.9 MG/DL
HBA1C MFR BLD: 5.7 %

## 2024-06-17 DIAGNOSIS — I10 ESSENTIAL HYPERTENSION: ICD-10-CM

## 2024-06-17 RX ORDER — METOPROLOL TARTRATE 50 MG/1
50 TABLET, FILM COATED ORAL DAILY
Qty: 90 TABLET | Refills: 1 | Status: SHIPPED | OUTPATIENT
Start: 2024-06-17

## 2024-06-17 NOTE — TELEPHONE ENCOUNTER
Medication:   Requested Prescriptions     Pending Prescriptions Disp Refills    metoprolol tartrate (LOPRESSOR) 50 MG tablet [Pharmacy Med Name: METOPROLOL TARTRATE 50 MG TAB] 90 tablet 1     Sig: TAKE 1 TABLET BY MOUTH DAILY       Last Filled:  2/8/24    Patient Phone Number: [phone] (home)     Last appt: 2/8/2024   Next appt: Visit date not found    Last Labs DM:   Lab Results   Component Value Date/Time    LABA1C 5.7 02/21/2024 07:37 AM     Last Lipid:   Lab Results   Component Value Date/Time    CHOL 213 02/21/2024 07:37 AM    TRIG 187 02/21/2024 07:37 AM    HDL 60 02/21/2024 07:37 AM     Last PSA: No results found for: \"PSA\"  Last Thyroid:   Lab Results   Component Value Date/Time    TSH 1.96 02/21/2024 07:37 AM

## 2024-09-17 DIAGNOSIS — I10 ESSENTIAL HYPERTENSION: ICD-10-CM

## 2024-09-18 RX ORDER — LOSARTAN POTASSIUM 100 MG/1
100 TABLET ORAL DAILY
Qty: 90 TABLET | Refills: 1 | Status: SHIPPED | OUTPATIENT
Start: 2024-09-18

## 2024-12-13 DIAGNOSIS — I10 ESSENTIAL HYPERTENSION: ICD-10-CM

## 2024-12-13 RX ORDER — METOPROLOL TARTRATE 50 MG
50 TABLET ORAL DAILY
Qty: 90 TABLET | Refills: 1 | Status: SHIPPED | OUTPATIENT
Start: 2024-12-13

## 2024-12-13 NOTE — TELEPHONE ENCOUNTER
Medication:   Requested Prescriptions     Pending Prescriptions Disp Refills    metoprolol tartrate (LOPRESSOR) 50 MG tablet [Pharmacy Med Name: METOPROLOL TARTRATE 50 MG TAB] 90 tablet 1     Sig: TAKE 1 TABLET BY MOUTH DAILY       Last Filled:  6/17/24    Patient Phone Number: [phone] (home)     Last appt: 2/8/2024   Next appt: 2/10/2025    Last Labs DM:   Lab Results   Component Value Date/Time    LABA1C 5.7 02/21/2024 07:37 AM     Last Lipid:   Lab Results   Component Value Date/Time    CHOL 213 02/21/2024 07:37 AM    TRIG 187 02/21/2024 07:37 AM    HDL 60 02/21/2024 07:37 AM     Last PSA: No results found for: \"PSA\"  Last Thyroid:   Lab Results   Component Value Date/Time    TSH 1.96 02/21/2024 07:37 AM

## 2025-02-09 ENCOUNTER — TELEPHONE (OUTPATIENT)
Dept: FAMILY MEDICINE CLINIC | Age: 70
End: 2025-02-09

## 2025-03-27 DIAGNOSIS — I10 ESSENTIAL HYPERTENSION: ICD-10-CM

## 2025-03-27 DIAGNOSIS — K21.9 GASTROESOPHAGEAL REFLUX DISEASE WITHOUT ESOPHAGITIS: ICD-10-CM

## 2025-03-27 RX ORDER — METOPROLOL TARTRATE 50 MG
50 TABLET ORAL DAILY
Qty: 90 TABLET | Refills: 1 | Status: SHIPPED | OUTPATIENT
Start: 2025-03-27

## 2025-03-27 RX ORDER — OMEPRAZOLE 20 MG/1
CAPSULE, DELAYED RELEASE ORAL
Qty: 60 CAPSULE | Refills: 5 | Status: SHIPPED | OUTPATIENT
Start: 2025-03-27

## 2025-03-27 RX ORDER — LOSARTAN POTASSIUM 100 MG/1
100 TABLET ORAL DAILY
Qty: 90 TABLET | Refills: 1 | Status: SHIPPED | OUTPATIENT
Start: 2025-03-27

## 2025-03-27 NOTE — TELEPHONE ENCOUNTER
Medication:   Requested Prescriptions     Pending Prescriptions Disp Refills    omeprazole (PRILOSEC) 20 MG delayed release capsule 60 capsule 5     Sig: TAKE ONE TO TWO CAPSULES BY MOUTH DAILY AS NEEDED    losartan (COZAAR) 100 MG tablet 90 tablet 1     Sig: Take 1 tablet by mouth daily    metoprolol tartrate (LOPRESSOR) 50 MG tablet 90 tablet 1     Sig: Take 1 tablet by mouth daily       Last Filled:      omeprazole (PRILOSEC) 20 MG delayed release capsule   1/16/24    metoprolol tartrate (LOPRESSOR) 50 MG tablet   12/13/24       losartan (COZAAR) 100 MG tablet     Patient Phone Number: [phone] (home)     Last appt: 2/8/2024   Next appt: Visit date not found    Last Labs DM:   Lab Results   Component Value Date/Time    LABA1C 5.7 02/21/2024 07:37 AM     Last Lipid:   Lab Results   Component Value Date/Time    CHOL 213 02/21/2024 07:37 AM    TRIG 187 02/21/2024 07:37 AM    HDL 60 02/21/2024 07:37 AM     Last PSA: No results found for: \"PSA\"  Last Thyroid:   Lab Results   Component Value Date/Time    TSH 1.96 02/21/2024 07:37 AM

## 2025-04-01 SDOH — ECONOMIC STABILITY: FOOD INSECURITY: WITHIN THE PAST 12 MONTHS, THE FOOD YOU BOUGHT JUST DIDN'T LAST AND YOU DIDN'T HAVE MONEY TO GET MORE.: NEVER TRUE

## 2025-04-01 SDOH — ECONOMIC STABILITY: FOOD INSECURITY: WITHIN THE PAST 12 MONTHS, YOU WORRIED THAT YOUR FOOD WOULD RUN OUT BEFORE YOU GOT MONEY TO BUY MORE.: NEVER TRUE

## 2025-04-01 SDOH — ECONOMIC STABILITY: TRANSPORTATION INSECURITY
IN THE PAST 12 MONTHS, HAS LACK OF TRANSPORTATION KEPT YOU FROM MEETINGS, WORK, OR FROM GETTING THINGS NEEDED FOR DAILY LIVING?: NO

## 2025-04-01 SDOH — ECONOMIC STABILITY: TRANSPORTATION INSECURITY
IN THE PAST 12 MONTHS, HAS THE LACK OF TRANSPORTATION KEPT YOU FROM MEDICAL APPOINTMENTS OR FROM GETTING MEDICATIONS?: NO

## 2025-04-01 ASSESSMENT — PATIENT HEALTH QUESTIONNAIRE - PHQ9
SUM OF ALL RESPONSES TO PHQ QUESTIONS 1-9: 0
SUM OF ALL RESPONSES TO PHQ QUESTIONS 1-9: 0
1. LITTLE INTEREST OR PLEASURE IN DOING THINGS: NOT AT ALL
SUM OF ALL RESPONSES TO PHQ QUESTIONS 1-9: 0
SUM OF ALL RESPONSES TO PHQ QUESTIONS 1-9: 0
2. FEELING DOWN, DEPRESSED OR HOPELESS: NOT AT ALL
2. FEELING DOWN, DEPRESSED OR HOPELESS: NOT AT ALL
SUM OF ALL RESPONSES TO PHQ9 QUESTIONS 1 & 2: 0
1. LITTLE INTEREST OR PLEASURE IN DOING THINGS: NOT AT ALL

## 2025-04-03 ENCOUNTER — OFFICE VISIT (OUTPATIENT)
Dept: FAMILY MEDICINE CLINIC | Age: 70
End: 2025-04-03

## 2025-04-03 VITALS
HEART RATE: 69 BPM | SYSTOLIC BLOOD PRESSURE: 98 MMHG | BODY MASS INDEX: 33.27 KG/M2 | WEIGHT: 212 LBS | DIASTOLIC BLOOD PRESSURE: 69 MMHG | OXYGEN SATURATION: 96 % | HEIGHT: 67 IN

## 2025-04-03 DIAGNOSIS — M17.11 PRIMARY OSTEOARTHRITIS OF RIGHT KNEE: ICD-10-CM

## 2025-04-03 DIAGNOSIS — I10 ESSENTIAL HYPERTENSION: ICD-10-CM

## 2025-04-03 DIAGNOSIS — R63.0 DECREASED APPETITE: ICD-10-CM

## 2025-04-03 DIAGNOSIS — M79.7 FIBROMYALGIA: ICD-10-CM

## 2025-04-03 DIAGNOSIS — Z13.1 DIABETES MELLITUS SCREENING: ICD-10-CM

## 2025-04-03 DIAGNOSIS — R42 DIZZINESS: Primary | ICD-10-CM

## 2025-04-03 DIAGNOSIS — G43.109 MIGRAINE WITH AURA AND WITHOUT STATUS MIGRAINOSUS, NOT INTRACTABLE: ICD-10-CM

## 2025-04-03 LAB — HBA1C MFR BLD: 5.8 %

## 2025-04-03 PROCEDURE — 3078F DIAST BP <80 MM HG: CPT | Performed by: NURSE PRACTITIONER

## 2025-04-03 PROCEDURE — 1123F ACP DISCUSS/DSCN MKR DOCD: CPT | Performed by: NURSE PRACTITIONER

## 2025-04-03 PROCEDURE — 3074F SYST BP LT 130 MM HG: CPT | Performed by: NURSE PRACTITIONER

## 2025-04-03 PROCEDURE — 83036 HEMOGLOBIN GLYCOSYLATED A1C: CPT | Performed by: NURSE PRACTITIONER

## 2025-04-03 PROCEDURE — 1159F MED LIST DOCD IN RCRD: CPT | Performed by: NURSE PRACTITIONER

## 2025-04-03 PROCEDURE — 99214 OFFICE O/P EST MOD 30 MIN: CPT | Performed by: NURSE PRACTITIONER

## 2025-04-03 RX ORDER — SUMATRIPTAN SUCCINATE 100 MG/1
100 TABLET ORAL
Qty: 9 TABLET | Refills: 3 | Status: SHIPPED | OUTPATIENT
Start: 2025-04-03 | End: 2027-05-30

## 2025-04-03 NOTE — PROGRESS NOTES
Nereida Schafer (:  1955) is a 69 y.o. female,Established patient, here for evaluation of the following chief complaint(s):  Headache (Headache, dizziness, not eating as much , low BP reading, muscle aches/Knee injection 4 weeks ago)       Assessment & Plan  Dizziness    Not progressing;  Hx of low blood pressure- SBP 70.  Stop losartan and continue metoprolol.  Patient to continue to monitor BP daily.  Increase fluids and try 5 small meals/day.  If symptoms persisting with decreasing medications will get further w/u.  ER precautions discussed.         Decreased appetite    Not progressing;  See above.         Primary osteoarthritis of right knee    Stable;  Patient follows with ortho.         Essential hypertension    Not progressing;  BP too low.  Stop losartan and continue metoprolol.  Patient to monitor daily and send BP readings after a few days.         Fibromyalgia    Stable;  Continue symptom management.         Migraine with aura and without status migrainosus, not intractable    Stable;  Refilled imitrex.    Orders:    SUMAtriptan (IMITREX) 100 MG tablet; Take 1 tablet by mouth once as needed for Migraine    Diabetes mellitus screening    Stable;  A1c ordered.    Orders:    POCT glycosylated hemoglobin (Hb A1C)      Return in about 2 weeks (around 2025) for AWV, BP recheck.       Subjective   HPI  Dizziness- every now and then; thinks it has to do with the storms; hasn't been this bad until fluctuation with weather  Decreased appetite  Feels like she has lost weight  Gets up- watches the news in the AM- gets up and makes beds, etc and then thinks to self hasn't eaten  Watermelon, apples, bananas, 1% milk, cookies  Dinner: meatloaf, mashed potatoes- portions are smaller  Not hungry  Muscle aches  Drinks a lot of water; has not been drinking pop; drinks zero gatorade, [employer] strawberry lemonade  Low BP- SBP 70 the other day (neighbor took it)- took BP medication with eating  Stop losartan,

## 2025-04-17 ENCOUNTER — OFFICE VISIT (OUTPATIENT)
Dept: FAMILY MEDICINE CLINIC | Age: 70
End: 2025-04-17
Payer: MEDICARE

## 2025-04-17 VITALS
WEIGHT: 215.6 LBS | SYSTOLIC BLOOD PRESSURE: 132 MMHG | BODY MASS INDEX: 33.84 KG/M2 | HEART RATE: 57 BPM | HEIGHT: 67 IN | OXYGEN SATURATION: 95 % | DIASTOLIC BLOOD PRESSURE: 86 MMHG

## 2025-04-17 DIAGNOSIS — I10 ESSENTIAL HYPERTENSION: ICD-10-CM

## 2025-04-17 DIAGNOSIS — Z00.00 ENCOUNTER FOR ANNUAL WELLNESS EXAM IN MEDICARE PATIENT: Primary | ICD-10-CM

## 2025-04-17 DIAGNOSIS — R42 DIZZINESS: ICD-10-CM

## 2025-04-17 PROCEDURE — 1123F ACP DISCUSS/DSCN MKR DOCD: CPT | Performed by: NURSE PRACTITIONER

## 2025-04-17 PROCEDURE — 3075F SYST BP GE 130 - 139MM HG: CPT | Performed by: NURSE PRACTITIONER

## 2025-04-17 PROCEDURE — G0439 PPPS, SUBSEQ VISIT: HCPCS | Performed by: NURSE PRACTITIONER

## 2025-04-17 PROCEDURE — 3079F DIAST BP 80-89 MM HG: CPT | Performed by: NURSE PRACTITIONER

## 2025-04-17 PROCEDURE — 1159F MED LIST DOCD IN RCRD: CPT | Performed by: NURSE PRACTITIONER

## 2025-04-17 PROCEDURE — 3017F COLORECTAL CA SCREEN DOC REV: CPT | Performed by: NURSE PRACTITIONER

## 2025-04-17 SDOH — ECONOMIC STABILITY: FOOD INSECURITY: WITHIN THE PAST 12 MONTHS, YOU WORRIED THAT YOUR FOOD WOULD RUN OUT BEFORE YOU GOT MONEY TO BUY MORE.: NEVER TRUE

## 2025-04-17 SDOH — ECONOMIC STABILITY: FOOD INSECURITY: WITHIN THE PAST 12 MONTHS, THE FOOD YOU BOUGHT JUST DIDN'T LAST AND YOU DIDN'T HAVE MONEY TO GET MORE.: NEVER TRUE

## 2025-04-17 NOTE — PROGRESS NOTES
Medicare Annual Wellness Visit  Name: Nereida Schafer Today’s Date: 2025   MRN: [id number] Sex: Female   Age: 69 y.o. Ethnicity: Non- / Non    : 1955 Race: White (non-)      Nereida Schafer is here for Hypertension (Pt states she is not feeling as dizzy as she was being off the medication)    Screenings for behavioral, psychosocial and functional/safety risks, and cognitive dysfunction are all negative except as indicated below. These results, as well as other patient data from the Health Risk Assessment form, are documented in Flowsheets linked to this Encounter.    Dizziness  Has a retired nurse neighbor  Still getting headaches, scratchy throat  Stopped losartan  Continued metoprolol    Allergies   Allergen Reactions    Clarithromycin      Breathing and itching       Prior to Visit Medications    Medication Sig Taking? Authorizing Provider   SUMAtriptan (IMITREX) 100 MG tablet Take 1 tablet by mouth once as needed for Migraine Yes Liat Richardson APRN - CNP   omeprazole (PRILOSEC) 20 MG delayed release capsule TAKE ONE TO TWO CAPSULES BY MOUTH DAILY AS NEEDED Yes Liat Richardson APRN - CNP   metoprolol tartrate (LOPRESSOR) 50 MG tablet Take 1 tablet by mouth daily Yes Liat Richardson APRN - CNP       Past Medical History:   Diagnosis Date    Fibromyalgia     GERD (gastroesophageal reflux disease)     Hiatal hernia     Dx on EGD before age 52.     HTN (hypertension)     Insomnia     Migraine      Past Surgical History:   Procedure Laterality Date    HYSTERECTOMY (CERVIX STATUS UNKNOWN)  2002/ endometriosis    KNEE SURGERY Left 2016    SHOULDER SURGERY Left 2013    ALEXIS AND BSO (CERVIX REMOVED)             Family History   Problem Relation Age of Onset    Heart Disease Mother     Cancer Maternal Cousin         breast        CareTeam (Including outside providers/suppliers regularly involved in providing care):   Patient Care Team:  Dylan

## 2025-07-17 ENCOUNTER — OFFICE VISIT (OUTPATIENT)
Dept: FAMILY MEDICINE CLINIC | Age: 70
End: 2025-07-17
Payer: MEDICARE

## 2025-07-17 VITALS
TEMPERATURE: 99.1 F | SYSTOLIC BLOOD PRESSURE: 130 MMHG | BODY MASS INDEX: 32.96 KG/M2 | DIASTOLIC BLOOD PRESSURE: 86 MMHG | HEART RATE: 68 BPM | OXYGEN SATURATION: 94 % | WEIGHT: 210 LBS | HEIGHT: 67 IN

## 2025-07-17 DIAGNOSIS — I10 ESSENTIAL HYPERTENSION: Primary | ICD-10-CM

## 2025-07-17 DIAGNOSIS — M79.89 SWELLING OF LOWER EXTREMITY: ICD-10-CM

## 2025-07-17 DIAGNOSIS — Z12.11 SCREENING FOR COLON CANCER: ICD-10-CM

## 2025-07-17 PROCEDURE — 3079F DIAST BP 80-89 MM HG: CPT | Performed by: NURSE PRACTITIONER

## 2025-07-17 PROCEDURE — G8400 PT W/DXA NO RESULTS DOC: HCPCS | Performed by: NURSE PRACTITIONER

## 2025-07-17 PROCEDURE — G8427 DOCREV CUR MEDS BY ELIG CLIN: HCPCS | Performed by: NURSE PRACTITIONER

## 2025-07-17 PROCEDURE — G9899 SCRN MAM PERF RSLTS DOC: HCPCS | Performed by: NURSE PRACTITIONER

## 2025-07-17 PROCEDURE — 1159F MED LIST DOCD IN RCRD: CPT | Performed by: NURSE PRACTITIONER

## 2025-07-17 PROCEDURE — 1123F ACP DISCUSS/DSCN MKR DOCD: CPT | Performed by: NURSE PRACTITIONER

## 2025-07-17 PROCEDURE — G8417 CALC BMI ABV UP PARAM F/U: HCPCS | Performed by: NURSE PRACTITIONER

## 2025-07-17 PROCEDURE — 1036F TOBACCO NON-USER: CPT | Performed by: NURSE PRACTITIONER

## 2025-07-17 PROCEDURE — 3017F COLORECTAL CA SCREEN DOC REV: CPT | Performed by: NURSE PRACTITIONER

## 2025-07-17 PROCEDURE — 99214 OFFICE O/P EST MOD 30 MIN: CPT | Performed by: NURSE PRACTITIONER

## 2025-07-17 PROCEDURE — 3075F SYST BP GE 130 - 139MM HG: CPT | Performed by: NURSE PRACTITIONER

## 2025-07-17 PROCEDURE — 1090F PRES/ABSN URINE INCON ASSESS: CPT | Performed by: NURSE PRACTITIONER

## 2025-07-17 RX ORDER — FUROSEMIDE 20 MG/1
20 TABLET ORAL DAILY PRN
Qty: 30 TABLET | Refills: 2 | Status: SHIPPED | OUTPATIENT
Start: 2025-07-17

## 2025-07-17 ASSESSMENT — PATIENT HEALTH QUESTIONNAIRE - PHQ9
2. FEELING DOWN, DEPRESSED OR HOPELESS: NOT AT ALL
SUM OF ALL RESPONSES TO PHQ QUESTIONS 1-9: 0

## 2025-07-17 NOTE — PROGRESS NOTES
Nereida Schafer (:  1955) is a 69 y.o. female,Established patient, here for evaluation of the following chief complaint(s):  Follow-up (3 month)    Assessment & Plan  Essential hypertension   Stable;  Continue off losartan.  Continue metoprolol.  Discussed can take Lasix PRN for swelling, but if taking it consistently will need to get blood work.  Continue to work on diet and exercise.         Swelling of lower extremity   Not progressing;  See above.  Hx of taking lasix and doing well.         Screening for colon cancer   Stable;  Patient adequate risks and agreeable to cologuard.    Orders:    Fecal DNA Colorectal cancer screening (Cologuard)      Return in about 6 months (around 2026).       Subjective   HPI  HYPERTENSION  Has been feeling really good  Gets up most AM- awake 8:20-8:35, then watches the news, by 10-10:30 will eat breakfast, straightening up the house, running errands  Got called back to work- previous co-worker was on vacation  Headache from the weather fronts- not from blood pressure  1 spell of dizziness vs. Missing a step going up  BP at home   Lost 5 lbs; eat a lot more fruits  Off losartan  On metoprolol    Review of Systems       Objective   Physical Exam  Vitals reviewed.   Constitutional:       Appearance: Normal appearance.   HENT:      Right Ear: External ear normal.      Left Ear: External ear normal.      Nose: Nose normal.   Cardiovascular:      Rate and Rhythm: Normal rate and regular rhythm.      Heart sounds: Normal heart sounds, S1 normal and S2 normal.   Pulmonary:      Effort: Pulmonary effort is normal.      Breath sounds: Normal breath sounds and air entry.   Musculoskeletal:      Right lower leg: No edema.      Left lower leg: No edema.   Neurological:      Mental Status: She is alert.   Psychiatric:         Mood and Affect: Mood normal.              An electronic signature was used to authenticate this note.    --Liat Richardson, APRN - CNP

## 2025-08-19 LAB — NONINV COLON CA DNA+OCC BLD SCRN STL QL: POSITIVE

## 2025-08-28 DIAGNOSIS — R19.5 POSITIVE COLORECTAL CANCER SCREENING USING COLOGUARD TEST: Primary | ICD-10-CM
